# Patient Record
Sex: FEMALE | Race: WHITE | URBAN - METROPOLITAN AREA
[De-identification: names, ages, dates, MRNs, and addresses within clinical notes are randomized per-mention and may not be internally consistent; named-entity substitution may affect disease eponyms.]

---

## 2022-02-15 ENCOUNTER — NURSE ONLY (OUTPATIENT)
Dept: INTERNAL MEDICINE CLINIC | Facility: HOSPITAL | Age: 27
End: 2022-02-15
Attending: EMERGENCY MEDICINE

## 2022-02-15 DIAGNOSIS — Z00.00 WELLNESS EXAMINATION: Primary | ICD-10-CM

## 2022-02-15 PROCEDURE — 86480 TB TEST CELL IMMUN MEASURE: CPT

## 2022-02-16 LAB
M TB IFN-G CD4+ T-CELLS BLD-ACNC: 0.02 IU/ML
M TB TUBERC IFN-G BLD QL: NEGATIVE
M TB TUBERC IGNF/MITOGEN IGNF CONTROL: >10 IU/ML
QFT TB1 AG MINUS NIL: 0.03 IU/ML
QFT TB2 AG MINUS NIL: 0.03 IU/ML

## 2022-05-31 ENCOUNTER — NURSE ONLY (OUTPATIENT)
Dept: INTERNAL MEDICINE CLINIC | Facility: HOSPITAL | Age: 27
End: 2022-05-31
Attending: EMERGENCY MEDICINE

## 2022-05-31 DIAGNOSIS — Z00.00 WELLNESS EXAMINATION: Primary | ICD-10-CM

## 2022-05-31 PROCEDURE — 86480 TB TEST CELL IMMUN MEASURE: CPT

## 2022-06-01 LAB
M TB IFN-G CD4+ T-CELLS BLD-ACNC: 0.02 IU/ML
M TB TUBERC IFN-G BLD QL: NEGATIVE
M TB TUBERC IGNF/MITOGEN IGNF CONTROL: >10 IU/ML
QFT TB1 AG MINUS NIL: 0.01 IU/ML
QFT TB2 AG MINUS NIL: 0.03 IU/ML

## 2022-06-24 ENCOUNTER — TELEPHONE (OUTPATIENT)
Dept: INTERNAL MEDICINE CLINIC | Facility: HOSPITAL | Age: 27
End: 2022-06-24

## 2022-06-24 ENCOUNTER — LAB ENCOUNTER (OUTPATIENT)
Dept: LAB | Facility: HOSPITAL | Age: 27
End: 2022-06-24
Attending: PREVENTIVE MEDICINE

## 2022-06-24 DIAGNOSIS — Z20.822 SUSPECTED 2019 NOVEL CORONAVIRUS INFECTION: ICD-10-CM

## 2022-06-24 DIAGNOSIS — Z20.822 SUSPECTED 2019 NOVEL CORONAVIRUS INFECTION: Primary | ICD-10-CM

## 2022-06-24 LAB — SARS-COV-2 RNA RESP QL NAA+PROBE: DETECTED

## 2022-06-24 NOTE — TELEPHONE ENCOUNTER
Results and RTW guidelines:    COVID RESULT:    [x] Viewed by employee in [address] RTW plan and instructions as indicated on triage call. Manager notified. Estimated RTW date: 6/28  [x] Discussed with employee   [] Unable to reach by phone. Sent via [employer] message      Test type:    [x] Rapid         [] Alinity         [] Outside test:       [x] Positive  Is employee unvaccinated? Yes []   No [x]          If yes:  Enter Covid Positive Medical exemption on Exemption Spreadsheet    Did you have close contact with someone on your unit while not wearing a mask? (e.g., during meal breaks):  Yes []   No []     If yes, who:     - Employee should quarantine at home for at least 5 days (day 1 is day after sx onset) , follow the CDC guidelines for cleaning and                              quarantining; see CDC.gov   -This employee may RTW on day 6 if asymptomatic or mildly symptomatic (with improving symptoms). Call Employee Health on day 5 if unable to return on day 6 after                      symptom onset.    -This employee needs to call [employer] on day 5 after symptom onset. The employee needs to be cleared by [employer]. - Monitor symptoms and temperature                 - Notify PCP of result                 - Seek emergent care with worsening symptoms   - If employee is still experiencing severe symptoms on day 5 must make a RTW appt with [employer], Employee will not be cleared if:    1. Has consistent cough, shortness of breath or fatigue that restricts your physical activities    2. Is still feeling \"unwell\"    3. Within 15 days of hospitalization for COVID    4. Within 20 days of intubation for COVID    5.  Still has a fever, vomiting or diarrhea   - Keep communication open with management about RTW and if symptoms worsen                - If outside testing completed, bring a copy of result to RTW appointment           Notes:     RTW PLAN:    [x]  If COVID positive results, off work minimum of 5 days from positive test or onset of symptoms (day 0)        On day 5, if asymptomatic or mildly symptomatic (with improving symptoms) may return to work day 6          On day 5, if symptomatic, call Employee Health for RTW screening        []  COVID positive result - call Employee Health on day 5 after symptom onset. The employee needs to be cleared by Employee Health to RTW. [] RTW immediately, continue to monitor for sx  [] RTW when sx improve; must be fever free for 24 hours w/o medications, Diarrhea/Vomiting free for 24 hours w/o medications  [] Alinity ordered; continue to monitor sx and call for new/worsening sx.   Discuss RTW guidelines with manager  [] May continue to work  [] Follow up with PCP  [] Home until further instruction from hotline with Alinity results  INSTRUCTIONS PROVIDED:  [x]  Plan as noted above  []  Length of time to obtain results   [x]  Quarantine instructions  [x]  Masking protocol   [x]  S/S of worsening infection/condition and importance of prompt medical re-evaluation including when to seek emergency care  [] If symptoms develop, stay home and call hotline for rapid test order    Estimated RTW date:  6/28    [x] The employee voiced understanding of above plan/instructions  [x] Manager Notified

## 2022-10-28 ENCOUNTER — IMMUNIZATION (OUTPATIENT)
Dept: LAB | Facility: HOSPITAL | Age: 27
End: 2022-10-28
Attending: PREVENTIVE MEDICINE

## 2022-10-28 DIAGNOSIS — Z23 NEED FOR VACCINATION: Primary | ICD-10-CM

## 2022-10-28 PROCEDURE — 0124A SARSCOV2 VAC BVL 30MCG/0.3ML: CPT

## 2022-10-28 PROCEDURE — 90471 IMMUNIZATION ADMIN: CPT

## 2022-11-22 ENCOUNTER — OFFICE VISIT (OUTPATIENT)
Dept: OBGYN CLINIC | Facility: CLINIC | Age: 27
End: 2022-11-22
Payer: COMMERCIAL

## 2022-11-22 ENCOUNTER — LAB ENCOUNTER (OUTPATIENT)
Dept: LAB | Age: 27
End: 2022-11-22
Attending: ADVANCED PRACTICE MIDWIFE
Payer: COMMERCIAL

## 2022-11-22 VITALS
HEIGHT: 72 IN | DIASTOLIC BLOOD PRESSURE: 73 MMHG | SYSTOLIC BLOOD PRESSURE: 106 MMHG | WEIGHT: 159 LBS | HEART RATE: 76 BPM | BODY MASS INDEX: 21.54 KG/M2

## 2022-11-22 DIAGNOSIS — Z13.220 SCREENING, LIPID: ICD-10-CM

## 2022-11-22 DIAGNOSIS — Z31.9 PATIENT DESIRES PREGNANCY: ICD-10-CM

## 2022-11-22 DIAGNOSIS — Z13.1 SCREENING FOR DIABETES MELLITUS: ICD-10-CM

## 2022-11-22 DIAGNOSIS — Z01.419 WOMEN'S ANNUAL ROUTINE GYNECOLOGICAL EXAMINATION: Primary | ICD-10-CM

## 2022-11-22 DIAGNOSIS — Z13.29 THYROID DISORDER SCREEN: ICD-10-CM

## 2022-11-22 LAB
CHOLEST SERPL-MCNC: 179 MG/DL (ref ?–200)
FASTING PATIENT GLUCOSE ANSWER: NO
FASTING PATIENT LIPID ANSWER: NO
GLUCOSE BLD-MCNC: 81 MG/DL (ref 70–99)
HDLC SERPL-MCNC: 81 MG/DL (ref 40–59)
LDLC SERPL CALC-MCNC: 89 MG/DL (ref ?–100)
NONHDLC SERPL-MCNC: 98 MG/DL (ref ?–130)
TRIGL SERPL-MCNC: 44 MG/DL (ref 30–149)
TSI SER-ACNC: 1.37 MIU/ML (ref 0.36–3.74)
VLDLC SERPL CALC-MCNC: 7 MG/DL (ref 0–30)

## 2022-11-22 PROCEDURE — 3074F SYST BP LT 130 MM HG: CPT | Performed by: ADVANCED PRACTICE MIDWIFE

## 2022-11-22 PROCEDURE — 36415 COLL VENOUS BLD VENIPUNCTURE: CPT

## 2022-11-22 PROCEDURE — 99385 PREV VISIT NEW AGE 18-39: CPT | Performed by: ADVANCED PRACTICE MIDWIFE

## 2022-11-22 PROCEDURE — 80061 LIPID PANEL: CPT

## 2022-11-22 PROCEDURE — 82947 ASSAY GLUCOSE BLOOD QUANT: CPT

## 2022-11-22 PROCEDURE — 3078F DIAST BP <80 MM HG: CPT | Performed by: ADVANCED PRACTICE MIDWIFE

## 2022-11-22 PROCEDURE — 3008F BODY MASS INDEX DOCD: CPT | Performed by: ADVANCED PRACTICE MIDWIFE

## 2022-11-22 PROCEDURE — 84443 ASSAY THYROID STIM HORMONE: CPT

## 2023-09-25 ENCOUNTER — LAB ENCOUNTER (OUTPATIENT)
Dept: LAB | Age: 28
End: 2023-09-25
Attending: FAMILY MEDICINE
Payer: COMMERCIAL

## 2023-09-25 ENCOUNTER — OFFICE VISIT (OUTPATIENT)
Dept: FAMILY MEDICINE CLINIC | Facility: CLINIC | Age: 28
End: 2023-09-25

## 2023-09-25 VITALS
BODY MASS INDEX: 21.13 KG/M2 | HEART RATE: 83 BPM | HEIGHT: 72 IN | WEIGHT: 156 LBS | DIASTOLIC BLOOD PRESSURE: 75 MMHG | TEMPERATURE: 98 F | OXYGEN SATURATION: 98 % | SYSTOLIC BLOOD PRESSURE: 112 MMHG

## 2023-09-25 DIAGNOSIS — Z01.419 WELL WOMAN EXAM WITH ROUTINE GYNECOLOGICAL EXAM: Primary | ICD-10-CM

## 2023-09-25 DIAGNOSIS — G89.29 CHRONIC LEFT SHOULDER PAIN: ICD-10-CM

## 2023-09-25 DIAGNOSIS — Z12.4 SCREENING FOR CERVICAL CANCER: ICD-10-CM

## 2023-09-25 DIAGNOSIS — M25.512 CHRONIC LEFT SHOULDER PAIN: ICD-10-CM

## 2023-09-25 LAB
ANION GAP SERPL CALC-SCNC: 7 MMOL/L (ref 0–18)
BASOPHILS # BLD AUTO: 0.02 X10(3) UL (ref 0–0.2)
BASOPHILS NFR BLD AUTO: 0.4 %
BUN BLD-MCNC: 13 MG/DL (ref 7–18)
BUN/CREAT SERPL: 13 (ref 10–20)
CALCIUM BLD-MCNC: 9.4 MG/DL (ref 8.5–10.1)
CHLORIDE SERPL-SCNC: 110 MMOL/L (ref 98–112)
CHOLEST SERPL-MCNC: 183 MG/DL (ref ?–200)
CO2 SERPL-SCNC: 25 MMOL/L (ref 21–32)
CREAT BLD-MCNC: 1 MG/DL
DEPRECATED RDW RBC AUTO: 43.1 FL (ref 35.1–46.3)
EGFRCR SERPLBLD CKD-EPI 2021: 79 ML/MIN/1.73M2 (ref 60–?)
EOSINOPHIL # BLD AUTO: 0.02 X10(3) UL (ref 0–0.7)
EOSINOPHIL NFR BLD AUTO: 0.4 %
ERYTHROCYTE [DISTWIDTH] IN BLOOD BY AUTOMATED COUNT: 12.2 % (ref 11–15)
FASTING PATIENT LIPID ANSWER: NO
FASTING STATUS PATIENT QL REPORTED: NO
GLUCOSE BLD-MCNC: 94 MG/DL (ref 70–99)
HCT VFR BLD AUTO: 42.8 %
HDLC SERPL-MCNC: 85 MG/DL (ref 40–59)
HGB BLD-MCNC: 13.6 G/DL
IMM GRANULOCYTES # BLD AUTO: 0 X10(3) UL (ref 0–1)
IMM GRANULOCYTES NFR BLD: 0 %
LDLC SERPL CALC-MCNC: 88 MG/DL (ref ?–100)
LYMPHOCYTES # BLD AUTO: 1.4 X10(3) UL (ref 1–4)
LYMPHOCYTES NFR BLD AUTO: 26.9 %
MAGNESIUM SERPL-MCNC: 2 MG/DL (ref 1.6–2.6)
MCH RBC QN AUTO: 30.4 PG (ref 26–34)
MCHC RBC AUTO-ENTMCNC: 31.8 G/DL (ref 31–37)
MCV RBC AUTO: 95.7 FL
MONOCYTES # BLD AUTO: 0.25 X10(3) UL (ref 0.1–1)
MONOCYTES NFR BLD AUTO: 4.8 %
NEUTROPHILS # BLD AUTO: 3.51 X10 (3) UL (ref 1.5–7.7)
NEUTROPHILS # BLD AUTO: 3.51 X10(3) UL (ref 1.5–7.7)
NEUTROPHILS NFR BLD AUTO: 67.5 %
NONHDLC SERPL-MCNC: 98 MG/DL (ref ?–130)
OSMOLALITY SERPL CALC.SUM OF ELEC: 294 MOSM/KG (ref 275–295)
PLATELET # BLD AUTO: 376 10(3)UL (ref 150–450)
POTASSIUM SERPL-SCNC: 4 MMOL/L (ref 3.5–5.1)
RBC # BLD AUTO: 4.47 X10(6)UL
SODIUM SERPL-SCNC: 142 MMOL/L (ref 136–145)
THYROPEROXIDASE AB SERPL-ACNC: 32 U/ML (ref ?–60)
TRIGL SERPL-MCNC: 51 MG/DL (ref 30–149)
TSI SER-ACNC: 0.64 MIU/ML (ref 0.36–3.74)
VLDLC SERPL CALC-MCNC: 8 MG/DL (ref 0–30)
WBC # BLD AUTO: 5.2 X10(3) UL (ref 4–11)

## 2023-09-25 PROCEDURE — 99385 PREV VISIT NEW AGE 18-39: CPT | Performed by: FAMILY MEDICINE

## 2023-09-25 PROCEDURE — 90686 IIV4 VACC NO PRSV 0.5 ML IM: CPT | Performed by: FAMILY MEDICINE

## 2023-09-25 PROCEDURE — 84443 ASSAY THYROID STIM HORMONE: CPT

## 2023-09-25 PROCEDURE — 83735 ASSAY OF MAGNESIUM: CPT

## 2023-09-25 PROCEDURE — 3074F SYST BP LT 130 MM HG: CPT | Performed by: FAMILY MEDICINE

## 2023-09-25 PROCEDURE — 85025 COMPLETE CBC W/AUTO DIFF WBC: CPT

## 2023-09-25 PROCEDURE — 3078F DIAST BP <80 MM HG: CPT | Performed by: FAMILY MEDICINE

## 2023-09-25 PROCEDURE — 90471 IMMUNIZATION ADMIN: CPT | Performed by: FAMILY MEDICINE

## 2023-09-25 PROCEDURE — 3008F BODY MASS INDEX DOCD: CPT | Performed by: FAMILY MEDICINE

## 2023-09-25 PROCEDURE — 36415 COLL VENOUS BLD VENIPUNCTURE: CPT

## 2023-09-25 PROCEDURE — 80061 LIPID PANEL: CPT

## 2023-09-25 PROCEDURE — 80048 BASIC METABOLIC PNL TOTAL CA: CPT

## 2023-09-29 LAB — LAST PAP RESULT: NORMAL

## 2024-05-09 ENCOUNTER — OFFICE VISIT (OUTPATIENT)
Dept: OBGYN CLINIC | Facility: CLINIC | Age: 29
End: 2024-05-09
Payer: COMMERCIAL

## 2024-05-09 ENCOUNTER — TELEPHONE (OUTPATIENT)
Dept: OBGYN CLINIC | Facility: CLINIC | Age: 29
End: 2024-05-09

## 2024-05-09 VITALS
DIASTOLIC BLOOD PRESSURE: 83 MMHG | HEIGHT: 72 IN | BODY MASS INDEX: 20.99 KG/M2 | HEART RATE: 67 BPM | WEIGHT: 155 LBS | SYSTOLIC BLOOD PRESSURE: 118 MMHG

## 2024-05-09 DIAGNOSIS — N92.6 MISSED MENSES: Primary | ICD-10-CM

## 2024-05-09 LAB
CONTROL LINE PRESENT WITH A CLEAR BACKGROUND (YES/NO): YES YES/NO
KIT LOT #: NORMAL NUMERIC
PREGNANCY TEST, URINE: POSITIVE

## 2024-05-09 NOTE — PATIENT INSTRUCTIONS
Healthy Eating Habits During Pregnancy    It’s important to develop healthy eating habits while you are pregnant, for you as well as for your baby. Here are some ways to stay healthy.  Aim for a healthy weight  A slow, steady rate of weight gain is often best. After the first trimester, you may gain about a pound a week. If you were overweight before pregnancy, you need to gain fewer pounds. Your healthcare provider can give you a healthy weight goal for your pregnancy.  Don’t diet  Now is not the time to diet. You may not get enough of the nutrients you and your baby need. Instead, learn how to be a healthy eater. Start by doing it for your baby. Soon, you may do it for yourself.  Vitamins and supplements  Talk with your healthcare provider about taking these and other prenatal vitamins and supplements.  Iron makes the extra blood you need now.  Calcium and vitamin D help build and keep strong bones.  Folic acid helps prevent certain birth defects.  Iodine helps the thyroid work right.  Some vitamins may not be safe to take. Your healthcare provider will tell you which ones to avoid.  Fluids  Drink at least 8 to 10 cups of fluid daily. Your baby needs fluids. Fluids also decrease constipation, flush out toxins and waste, limit swelling, and help prevent bladder infections. Water is best. Other good choices are:  Water or seltzer water with a slice of lemon or lime (These can also help ease an upset stomach.)  Clear soups that are low in salt  Low-fat or fat-free milk, soy or rice milk with calcium added  Popsicles or gelatin  Things to avoid  Some things might harm your growing baby. Don’t eat or drink:  Alcohol  Unpasteurized dairy foods and juices  Raw or undercooked meat, poultry, fish, or eggs  Unwashed fruits and vegetables  Prepared meats, like deli meats or hot dogs, unless heated until steaming hot  Fish that are high in mercury, like shark, swordfish, raheem mackerel, tilefish, and albacore tuna  Things to  limit  Ask your healthcare provider whether it’s safe to eat or drink:  Caffeine  Artificial sweeteners  Organ meats  Certain types of fish  Fish and shellfish that contain mercury in lower amounts, like shrimp, canned light tuna, salmon, pollock, and catfish  Vj last reviewed this educational content on 2018 [employer]. [address] [place] [address], [place] [address]. All rights reserved. This information is not intended as a substitute for professional medical care. Always follow your healthcare professional's instructions.        Nutrition During Pregnancy    Having a healthy baby depends mostly on you. What you eat matters to your baby and your health. During pregnancy, you will likely need about 300 more calories per day than before you became pregnant. Each day, try to eat the number of servings listed here for each food group. In addition, cut down on salt and caffeine. Limit the amount of sweets and high-fat foods you eat. Don’t smoke or drink alcohol.  Important: See your healthcare provider as often as requested. If you have any questions, be sure to ask them.  Fruits  2 cups  Examples of 1-cup servings:  1 medium apple  1 medium orange  1 medium banana  1 cup chopped fruit  1 cup 100% fruit juice (pasteurized)  1/2 cup dried fruit Vegetables  2-1/2 to 3 cups   Examples of 1 servin cups raw, leafy greens  1 cup raw or cooked cut-up vegetables  1 cup 100% vegetable juice (pasteurized) Grains & Cereals*  6 to 8 ounces  Examples of 1-ounce servings:  1 slice bread  1/2 cup cooked rice  1/2 cup cooked cereal  1/2 cup pasta  1 ounce cold cereal Fats & Oils  6 to 8 teaspoons   Dairy**  3 cups  Examples of 1-cup servings:  1 cup milk  1 cup yogurt  1-1/2 ounces natural cheese  2 ounces processed cheese Protein---  5 to 6-1/2 ounces  Examples of 1-ounce servings:  1 egg  1 ounce of lean meat, poultry, or fish  1/4 cup cooked beans  1 tablespoon peanut butter  1/2 ounce nuts  Fluids  8 or more 8-ounce glasses  Examples:  Water  Diluted juices: Apple, orange, cranberry  Mineral water  Clear soups, broth     *Note: Choose whole grains whenever possible.  ** Note: Try to choose low-fat options; avoid soft cheeses and unpasteurized milk.  --- Notes: Avoid raw or undercooked meats, eggs, and seafood. fish, and shellfish. Also, some types of fish, like shark, swordfish, and raheem mackerel should not be eaten during pregnancy. Avoid hot dogs, luncheon meats, and cold cuts unless heated to steaming just before being served. Ask your healthcare provider about safe choices.     Prenatal supplements  A prenatal supplement is a pill that you take daily during pregnancy. It helps make sure you’re getting the right amount of certain nutrients that are important to your baby. Ask your healthcare provider to help you choose the best one for you. Important nutrients during pregnancy include:  Folic acid. It's best to start taking this supplement 1 month before you start trying to get pregnant. Folic acid helps prevent certain problems in your baby. During pregnancy, you need to take 400 micrograms (mcg) of folic acid every day for the first 2 to 3 months after conception, and then 600 mcg is needed for growing fetus and placenta.  Iron, calcium, and vitamin D. You may also be advised to take these supplements during pregnancy. They help keep you and your baby healthy. Be sure to take them at different times because calcium makes it hard for the body to absorb iron. Taking iron with orange juice helps to increase its absorption.   [employer] last reviewed this educational content on 12/1/2017 © 2000-2020 The [employer]. [address] [place] [address], [place] [address]. All rights reserved. This information is not intended as a substitute for professional medical care. Always follow your healthcare professional's instructions.        Pregnancy: Planning Your Exercise Routine    While you’re pregnant, an  exercise routine helps both your mind and your body feel good. It tones your muscles and makes them stronger. It also gives you and your baby more oxygen.  The right exercise for you  Overall conditioning is best for you and your baby. Try walking, swimming, or riding a stationary bike. Always warm up, cool down, and drink enough fluids. Keep a snack close by in case your blood sugar gets low. Discuss exercise choices with your healthcare provider. Talk about the following:  If you already exercise, find out how to adapt your routine while you’re pregnant. Keep the intensity of the exercise moderate.  Ask if there are any local prenatal exercise classes, like yoga or water aerobics. Find out which prenatal exercise videos are good choices.  If you were not exercising before your pregnancy, find out the best way to start. Now is not the time to begin a new workout on your own. Start slowly. Listen to your body.  Ask which forms of exercise you should avoid. These may include risky activities like hot yoga, horseback riding, scuba diving, skiing, skating, and contact sports.  Pelvic tilts  These help strengthen your stomach muscles and low back. You can do pelvic tilts instead of sit-ups.  Do this exercise on your hands and knees.  Relax the back of your neck. Pull your stomach in until your low back flattens.  Hold for 30 seconds. Release. Repeat 10 times. Do this twice a day.  Kegel exercises  Kegel exercises strengthen the pelvic muscles. Doing Kegels daily helps prepare these muscles for delivery. Kegels also help ease your recovery. You exercise these muscles by tightening, holding, then relaxing them. To do 1 type of Kegel exercise, contract as if you were stopping your urine stream (but do it when you’re not urinating). Hold for 10 seconds, then repeat 10 times, a few times a day.  Tips to add activity  Here are some tips to follow:  Park the car farther from a store and walk.  If you can, do errands on foot  instead of driving.  Walk across the office to talk to someone in person instead of calling.  While waiting for appointments, go up and down stairs or around the block.   Tips to stay active  Here are some tips to follow:  Maintain your routine. But exercise less intensely if you feel tired.  Base your workout on how you feel, not your heart rate. Heart rates aren’t a good way to measure effort during pregnancy.  Avoid exercising on your back after week 16.   What are the warning signs that I should stop exercising?  Stop exercising and call your healthcare provider if you have any of these symptoms:  Vaginal bleeding   Dizziness or feeling faint   Increased shortness of breath   Chest pain   Headache   Muscle weakness   Calf (back of the leg) pain or swelling    Uterine contractions or pre-term labor   Decreased fetal movement   Fluid leaking (or gushing) from your vagina  [employer] last reviewed this educational content on 1/1/2018  © 1454-5536 [employer]. [address] [place] [address]. All rights reserved. This information is not intended as a substitute for professional medical care. Always follow your healthcare professional's instructions.        Exercise During Pregnancy  Regular exercise can help you adapt to the changes your body is going through during pregnancy. Exercising may help you relax, and it gets you ready for labor and delivery. Talk to your healthcare provider about the kinds of activities you can do. Then go ahead and enjoy them.    Get started  Even if you didn’t exercise before pregnancy, it is not too late to start. Choose an activity that you like and that fits your lifestyle. Begin slowly and build up a little at a time. Be sure to check with your healthcare provider before starting any exercise program. The following tips may help you get started:  Choose a time and place to exercise each day.  Wear loose-fitting clothes and comfortable athletic shoes.  Stretch  before and after you exercise. (Be sure to stretch slowly and to hold stretches for 30 to 40 seconds.)  Be active  Unless your healthcare provider says otherwise, try to exercise for 30 minutes or more most days of the week:  Overall conditioning, like swimming, bicycling, or walking, is especially beneficial.  Aerobics and exercises that increase your pulse rate help condition your body and strengthen your heart. Ask about special prenatal aerobics classes.  Exercise safely  These tips will help you have a safe, healthy workout:  Stay cool. Stop exercising if you feel overheated.  Slow down if you’re out of breath. If you can’t talk during exercise, lower the intensity of the workout.  Monitor the intensity of your workout. Only do moderate-intensity (not strenuous) exercise.  Stay off your back. Lying on your back can decrease blood flow to your baby.  Drink water before, during and after your workout.  Eat 300 extra calories a day. A light snack before and after you exercise will help keep your energy up.  Avoid activities requiring balancing skills later in pregnancy.  Do Kegel exercises  Kegel exercises strengthen the pelvic floor muscles used in childbirth. These muscles are the same ones used to stop the flow of urine. Do Kegel exercises daily:  Squeeze your pelvic floor muscles for a count of 3.  Relax, then squeeze again.  Repeat 10 to 15 times in a row at least 3 times a day.  You can do Kegel exercises anytime and anywhere.  Keep walking  No matter what other exercise you do, try to walk whenever you can:  If you’re working all day, take a lunchtime walk in the park with a friend.  When you shop, park away from the store entrance and walk the extra distance.  Take the stairs instead of the elevator.     When to stop exercising and call your healthcare provider  Call your healthcare provider right away if you have:  Shortness of breath before starting exercise  Vaginal bleeding  Dizziness or feeling  faint  Chest pain  Headache  Decreased fetal movement   contractions   Muscle weakness  Calf pain or swelling  Fluid leaking from the vagina   [email or web address] last reviewed this educational content on 2017 The [employer]. [address] [place] [address], [place] [address]. All rights reserved. This information is not intended as a substitute for professional medical care. Always follow your healthcare professional's instructions.        Pregnancy: Your Weight     Your weight will be checked regularly by your healthcare provider.   Being a healthy weight is important for both you and your baby. The weight you gain now is not just extra fat. It is also the weight of your baby. And it is the increased blood and fluids to support the baby. A slow, steady rate of gain is best. How much you should gain depends on your weight before getting pregnant. Check with your healthcare provider to find out what is right for you.  Talk to your healthcare provider if you have any questions.   If you gain too much  Gaining too much weight might cause you to feel tired or you could have a harder pregnancy or birth. If you and your healthcare provider decide you’re gaining too much:  Eat fewer fats and sugars. Instead, eat fruit, vegetables, and whole-grain foods.  Drink plenty of water between meals.  Get at least 20 minutes of light exercise, like walking, each day.  Don’t diet. You might not get enough of the nutrients you or your baby needs.  Keep a diet diary to help you gauge what and how much you are eating.  If you’re not gaining enough  If you don’t gain enough, your baby could be too small or have health problems. Women tend to gain most of their weight in the second and third trimesters. For now:  Eat many types of foods. Make sure you get enough calcium, protein, and carbohydrates.  Don’t skip meals.  Eat healthy snacks.  Pick nutrient-dense, high-calorie healthy food like trail mix or protein  [place].  See a dietitian for help.  Talk to your healthcare provider if you have had an eating disorder or problems with certain foods.  The following are ways to get more calories:  Eat breakfast every day. Peanut butter or a slice of cheese on toast can give you an extra protein boost.  Snack between meals. Yogurt and dried fruits can provide protein, calcium, and minerals.  Try to eat more foods that are high in good fats, like nuts, fatty fish, avocados, and olive oil.  Drink juices made from real fruit that are high in vitamin C or beta carotene, like grapefruit juice, orange juice, papaya nectar, apricot nectar, and carrot juice.  Avoid junk food, like foods high in sugar.  [employer] last reviewed this educational content on 1/1/2018 © 2000-2020 The [employer]. [address] [place] [address]. All rights reserved. This information is not intended as a substitute for professional medical care. Always follow your healthcare professional's instructions.        Dental Care for Pregnant Women  Pregnancy is a time when your oral health needs more attention. Hormone changes during pregnancy can cause certain problems with teeth and gums, and make treatment more complicated.  How pregnancy affects oral health  During pregnancy, hormone changes can cause swollen, bleeding, and irritated gums (gingivitis). Your gums may be very sore, and brushing and flossing may cause discomfort. If left untreated, gingivitis can lead to a more serious gum disease called periodontitis. Severe periodontitis can lead to tooth loss.  Some pregnant women also have small bright-red growths on their gums that bleed easily. These are often called “pregnancy tumors.” They are not cancer. They usually go away right after birth. Talk with your dentist or healthcare provider if you have concerns.  Keeping a healthy mouth  Brush twice daily with fluoride toothpaste. Floss at least once a day.  If you have morning sickness,  rinse your mouth with a teaspoon of baking soda mixed with water after vomiting. Do not brush your teeth right after vomiting. This can remove tooth enamel.  See your dentist for cleanings and checkups more often if needed. This is especially true in your second and third trimesters.  Ask your dentist or healthcare provider if you should use a special mouth rinse to help prevent gingivitis.  Tell your dentist or healthcare provider about any changes in your mouth, such as soreness or bleeding.  Safety concerns  Make sure to tell your dentist that you’re pregnant. He or she can help you stay safe. If you need to have dental X-rays during pregnancy, he or she will make sure you are fully protected. You will wear a lead apron over your belly during the X-ray process. The apron helps block radiation from the X-rays.  If you need to take medicines like antibiotics or pain relievers for dental problems, talk with your healthcare providers first. Your providers will discuss the risks and benefits of taking these during pregnancy.  If you have a high-risk pregnancy, your dentist and your healthcare provider may advise that certain dental treatments wait until after you give birth.  Vj last reviewed this educational content on 12/1/2017  © 9616-0563 [employer], [email or web address]. [address] [place] [address], [place] [address]. All rights reserved. This information is not intended as a substitute for professional medical care. Always follow your healthcare professional's instructions.

## 2024-05-09 NOTE — TELEPHONE ENCOUNTER
Patient called to schedule ultrasound. [facility] does not have order and not sure what patient is requesting. Please fax copy of order to [phone]

## 2024-05-09 NOTE — PROGRESS NOTES
CHIEF COMPLAINT:  Chief Complaint   Patient presents with    Gyn Exam     Missed mense LMP , spotting last week , with cramping. Clear discharge       HPI:  Cleopatra is 29 year old, female, here today for a missed menses visit.  Currently, she is feeling well. She had some spotting a few days last week but none since then. Denies cramping/pain. Her and her  have been trying for pregnancy for 1.5 years. Reports regular 28 day cycles.    Patient's last menstrual period was 2024.            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 No pertinent past medical history.   Past Surgical History:   Procedure Laterality Date    Other surgical history      Bunion Surgery    [place] teeth removed Bilateral       Family History   Problem Relation Age of Onset    Thyroid disease Mother     Cancer Maternal Grandmother         Breast cancer    Diabetes Maternal Grandmother     Heart Disease Maternal Grandfather     Cancer Paternal Grandmother         Breast cancer and lung cancer    Heart Disease Paternal Grandfather     Cancer Paternal Grandfather         Lung Cancer    Heart Attack Paternal Grandfather       Social History:   Social History     Socioeconomic History    Marital status:    Tobacco Use    Smoking status: Never     Passive exposure: Never    Smokeless tobacco: Never   Substance and Sexual Activity    Alcohol use: Yes     Alcohol/week: 4.0 standard drinks of alcohol     Types: 1 Glasses of wine, 3 Standard drinks or equivalent per week    Drug use: Never       Safe relationship/safe home environment      Medications (Active prior to today's visit):  No current outpatient medications on file.       Allergies:  No Known Allergies    REVIEW OF SYSTEMS:  Review of Systems   Constitutional: Negative.    Respiratory: Negative.     Cardiovascular: Negative.    Gastrointestinal: Negative.     Genitourinary:  Negative for difficulty urinating, dyspareunia, dysuria, frequency, genital sores, hematuria, menstrual problem, pelvic pain, urgency, vaginal bleeding, vaginal discharge and vaginal pain.   Neurological: Negative.    Psychiatric/Behavioral: Negative.          PHYSICAL EXAM:  Vitals:    05/09/24 1155   BP: 118/83   Pulse: 67     Physical Exam  Vitals and nursing note reviewed.   Constitutional:       General: She is not in acute distress.     Appearance: Normal appearance. She is normal weight. She is not ill-appearing.   HENT:      Head: Normocephalic and atraumatic.   Cardiovascular:      Rate and Rhythm: Normal rate.   Pulmonary:      Effort: Pulmonary effort is normal. No respiratory distress.   Neurological:      Mental Status: She is alert and oriented to person, place, and time.   Psychiatric:         Mood and Affect: Mood normal.         Behavior: Behavior normal.         Thought Content: Thought content normal.         Judgment: Judgment normal.          Recent Results (from the past 24 hour(s))   POC Urine pregnancy test [21665]    Collection Time: 05/09/24 12:05 PM   Result Value Ref Range    Pregnancy Test, Urine positive     Control Line Present with a clear background (yes/no) yes Yes/No    Kit Lot # [id number] Numeric    Kit Expiration Date 5/4/25 Date        ASSESSMENT/PLAN:   Cleopatra was seen today for gyn exam.    Diagnoses and all orders for this visit:    Missed menses  -     POC Urine pregnancy test [[id number]]  -     US OB 1st Trimester Abdominal <14 wks [[id number]]; Future  -     US OB Transvaginal (any trimester) [96365]; Future  - Viability US ordered per pt preference, pt instructed to schedule in 2-4 weeks from now  - 1T warning signs reviewed       Today's UCG positive result reviewed with patient  Appropriate for CNM care   Has prenatal vitamins that she is taking  ERx baby ASA n/a    Pre-eclampsia Risk Assessment:   High Risk Factors (any 1 of below):   - H/O preeclampsia in prior  pregnancy  - Autoimmune disease (lupus, antiphospholipid syndrome)  - Multifetal pregnancy  - cHTN  - Diabetes  Moderate Risk Factors (any 2 of below): 1/2    - Nulliparus  - Obesity  - H/O preeclampsia in 1st degree relative  - Socioeconomic characeristics (AA race, low socioeconomic status)  - AMA  - Personal H/O prior SGA or low birth weight, adverse pregnancy outcome, >10yr pregnancy interval    Next visit: Patient to schedule Nurse Education visit, next available, and NOB for 12wga    Counseling included:  -- CNM care, philosophy, and protocols  -- Discussed importance of folic acid, calcium, vitamin D  -- Reviewed pregnancy recommendations regarding weight gain, diet, safe food preparation and consumption  -- Travel discussed, avoid travel to zika and COVID zones, use of support stockings with flights longer than 3 hours, movement every 2-3 hours if driving  -- Discussed nutrition, exercise, weight gain.  -- Discussed avoidance of Jacuzzis, saunas, hot tubs and steam rooms  -- Discussed avoidance of alcohol, smoking, and minimizing caffeine  -- Warning signs reviewed. Advised to call office if occur. Safe use of [employer] for messaging  -- Reviewed genetic/chromosomal screening guidelines for pregnancies  -- Schedule RN OB ED visit   -- 30 minutes face to face counseling, chart review, orders and coordination of care    Pt verbalized understanding. All questions answered. No barriers to learning identified

## 2024-05-31 ENCOUNTER — HOSPITAL ENCOUNTER (OUTPATIENT)
Dept: ULTRASOUND IMAGING | Age: 29
Discharge: HOME | End: 2024-05-31
Attending: ADVANCED PRACTICE MIDWIFE
Payer: COMMERCIAL

## 2024-05-31 DIAGNOSIS — N92.6 MISSED MENSES: ICD-10-CM

## 2024-05-31 PROCEDURE — 76817 TRANSVAGINAL US OBSTETRIC: CPT | Performed by: ADVANCED PRACTICE MIDWIFE

## 2024-05-31 PROCEDURE — 76801 OB US < 14 WKS SINGLE FETUS: CPT | Performed by: ADVANCED PRACTICE MIDWIFE

## 2024-06-05 ENCOUNTER — NURSE ONLY (OUTPATIENT)
Dept: OBGYN CLINIC | Facility: CLINIC | Age: 29
End: 2024-06-05

## 2024-06-05 DIAGNOSIS — Z01.42 ENCOUNTER FOR PAPANICOLAOU CERVICAL SMEAR TO CONFIRM FINDINGS OF RECENT NORMAL SMEAR FOLLOWING INITIAL ABNORMAL SMEAR: Primary | ICD-10-CM

## 2024-06-05 DIAGNOSIS — Z34.01 ENCOUNTER FOR SUPERVISION OF NORMAL FIRST PREGNANCY IN FIRST TRIMESTER (HCC): ICD-10-CM

## 2024-06-05 RX ORDER — CHOLECALCIFEROL (VITAMIN D3) 25 MCG
1 TABLET,CHEWABLE ORAL DAILY
COMMUNITY

## 2024-06-05 NOTE — PROGRESS NOTES
Pt is a  with EDC of 01/15/2025 based on 24 FT ultrasound.     LMP-2024     Med Hx-[place] Teeth                 Bunion surgery    OB Hx-primip    Telephone OB RN Education visit. Education materials reviewed with pt including, but not limited to: plan of care, safe medications, guidance on nutrition, travel, food safety, when to call the MD,ect.     Pt agreeable to blood transfusion if needed.     First trimester prenatal labs ordered for pt.     Pt counseled on the availability of genetic screenings including: cell free DNA, FTS w/US,Quad screen, MSAFP, and CF screening. Pt refuses at this time.     Media policy for [facility] reviewed with pt.    EPDS score for today-0    Epidural-NO    Circumcision-yes    Feeding-breast    Transfusion-yes    How pt heard about the midwives office-[facility] RN    New OB visit with Yoly Mckeon on 2024

## 2024-06-13 ENCOUNTER — LAB ENCOUNTER (OUTPATIENT)
Dept: LAB | Facility: HOSPITAL | Age: 29
End: 2024-06-13
Attending: ADVANCED PRACTICE MIDWIFE
Payer: COMMERCIAL

## 2024-06-13 DIAGNOSIS — Z34.01 ENCOUNTER FOR SUPERVISION OF NORMAL FIRST PREGNANCY IN FIRST TRIMESTER (HCC): ICD-10-CM

## 2024-06-13 LAB
ANTIBODY SCREEN: NEGATIVE
BASOPHILS # BLD AUTO: 0.01 X10(3) UL (ref 0–0.2)
BASOPHILS NFR BLD AUTO: 0.2 %
DEPRECATED RDW RBC AUTO: 39.5 FL (ref 35.1–46.3)
EOSINOPHIL # BLD AUTO: 0.03 X10(3) UL (ref 0–0.7)
EOSINOPHIL NFR BLD AUTO: 0.5 %
ERYTHROCYTE [DISTWIDTH] IN BLOOD BY AUTOMATED COUNT: 12.1 % (ref 11–15)
EST. AVERAGE GLUCOSE BLD GHB EST-MCNC: 103 MG/DL (ref 68–126)
HBA1C MFR BLD: 5.2 % (ref ?–5.7)
HBV SURFACE AG SER-ACNC: 0.1 [IU]/L
HBV SURFACE AG SERPL QL IA: NONREACTIVE
HCT VFR BLD AUTO: 36.4 %
HCV AB SERPL QL IA: NONREACTIVE
HGB BLD-MCNC: 13 G/DL
IMM GRANULOCYTES # BLD AUTO: 0.02 X10(3) UL (ref 0–1)
IMM GRANULOCYTES NFR BLD: 0.3 %
LYMPHOCYTES # BLD AUTO: 1.59 X10(3) UL (ref 1–4)
LYMPHOCYTES NFR BLD AUTO: 25.3 %
MCH RBC QN AUTO: 32 PG (ref 26–34)
MCHC RBC AUTO-ENTMCNC: 35.7 G/DL (ref 31–37)
MCV RBC AUTO: 89.7 FL
MONOCYTES # BLD AUTO: 0.43 X10(3) UL (ref 0.1–1)
MONOCYTES NFR BLD AUTO: 6.8 %
NEUTROPHILS # BLD AUTO: 4.2 X10 (3) UL (ref 1.5–7.7)
NEUTROPHILS # BLD AUTO: 4.2 X10(3) UL (ref 1.5–7.7)
NEUTROPHILS NFR BLD AUTO: 66.9 %
PLATELET # BLD AUTO: 324 10(3)UL (ref 150–450)
RBC # BLD AUTO: 4.06 X10(6)UL
RH BLOOD TYPE: POSITIVE
RUBV IGG SER QL: POSITIVE
RUBV IGG SER-ACNC: 74 IU/ML (ref 10–?)
T PALLIDUM AB SER QL IA: NONREACTIVE
WBC # BLD AUTO: 6.3 X10(3) UL (ref 4–11)

## 2024-06-13 PROCEDURE — 87086 URINE CULTURE/COLONY COUNT: CPT

## 2024-06-13 PROCEDURE — 86901 BLOOD TYPING SEROLOGIC RH(D): CPT

## 2024-06-13 PROCEDURE — 86900 BLOOD TYPING SEROLOGIC ABO: CPT

## 2024-06-13 PROCEDURE — 87389 HIV-1 AG W/HIV-1&-2 AB AG IA: CPT

## 2024-06-13 PROCEDURE — 86780 TREPONEMA PALLIDUM: CPT

## 2024-06-13 PROCEDURE — 83020 HEMOGLOBIN ELECTROPHORESIS: CPT

## 2024-06-13 PROCEDURE — 86787 VARICELLA-ZOSTER ANTIBODY: CPT

## 2024-06-13 PROCEDURE — 83021 HEMOGLOBIN CHROMOTOGRAPHY: CPT

## 2024-06-13 PROCEDURE — 86850 RBC ANTIBODY SCREEN: CPT

## 2024-06-13 PROCEDURE — 83036 HEMOGLOBIN GLYCOSYLATED A1C: CPT

## 2024-06-13 PROCEDURE — 87340 HEPATITIS B SURFACE AG IA: CPT

## 2024-06-13 PROCEDURE — 86803 HEPATITIS C AB TEST: CPT

## 2024-06-13 PROCEDURE — 36415 COLL VENOUS BLD VENIPUNCTURE: CPT

## 2024-06-13 PROCEDURE — 86762 RUBELLA ANTIBODY: CPT

## 2024-06-13 PROCEDURE — 85025 COMPLETE CBC W/AUTO DIFF WBC: CPT

## 2024-06-14 LAB — VZV IGG SER IA-ACNC: 426.1 (ref 165–?)

## 2024-06-19 LAB
HGB A2 MFR BLD: 2.8 % (ref 1.5–3.5)
HGB PNL BLD ELPH: 97.2 % (ref 95.5–100)

## 2024-07-01 ENCOUNTER — LAB ENCOUNTER (OUTPATIENT)
Dept: LAB | Facility: HOSPITAL | Age: 29
End: 2024-07-01
Attending: ADVANCED PRACTICE MIDWIFE
Payer: COMMERCIAL

## 2024-07-01 ENCOUNTER — INITIAL PRENATAL (OUTPATIENT)
Dept: OBGYN CLINIC | Facility: CLINIC | Age: 29
End: 2024-07-01
Payer: COMMERCIAL

## 2024-07-01 VITALS
DIASTOLIC BLOOD PRESSURE: 73 MMHG | BODY MASS INDEX: 22 KG/M2 | HEART RATE: 76 BPM | WEIGHT: 163 LBS | SYSTOLIC BLOOD PRESSURE: 110 MMHG

## 2024-07-01 DIAGNOSIS — Z34.91 PRENATAL CARE, FIRST TRIMESTER (HCC): Primary | ICD-10-CM

## 2024-07-01 DIAGNOSIS — Z83.49 FAMILY HISTORY OF THYROID DISORDER: ICD-10-CM

## 2024-07-01 PROBLEM — Z34.90 PREGNANT (HCC): Status: ACTIVE | Noted: 2024-07-01

## 2024-07-01 LAB
T4 FREE SERPL-MCNC: 1.3 NG/DL (ref 0.8–1.7)
TSI SER-ACNC: 0.62 MIU/ML (ref 0.55–4.78)

## 2024-07-01 PROCEDURE — 84443 ASSAY THYROID STIM HORMONE: CPT

## 2024-07-01 PROCEDURE — 84439 ASSAY OF FREE THYROXINE: CPT

## 2024-07-01 PROCEDURE — 36415 COLL VENOUS BLD VENIPUNCTURE: CPT

## 2024-07-01 NOTE — PROGRESS NOTES
Here for NOB visit. Denies bleeding or pelvic pain.    TSH and FT4 ordered due to family history of thyroid disorder     Patient's last menstrual period was 2024. 1/15/2025, by Ultrasound 11w5d     NOB labs- WNL  Genetic screening- declines  Ultrasound: 24 at 8wk WNL  Med hx: nothing significant  Allergies: NKDA.    Problem list- Updated  EPDS=0 on 24     Pre-e risk: nullip  Prior births:n/a    Physical: WNL  Pap: 23 NILM    Warning signs reviewed.

## 2024-07-16 ENCOUNTER — NURSE TRIAGE (OUTPATIENT)
Dept: FAMILY MEDICINE CLINIC | Facility: CLINIC | Age: 29
End: 2024-07-16

## 2024-07-16 ENCOUNTER — TELEPHONE (OUTPATIENT)
Dept: OBGYN CLINIC | Facility: CLINIC | Age: 29
End: 2024-07-16

## 2024-07-16 DIAGNOSIS — R06.02 SHORTNESS OF BREATH: Primary | ICD-10-CM

## 2024-07-16 NOTE — TELEPHONE ENCOUNTER
Patient called in is 13 weeks pregnant.  Patient  request a referral to cologist Patient states she is having difficult breathing. .   Request a nurse to call to confirm

## 2024-07-16 NOTE — TELEPHONE ENCOUNTER
For the past 2 weeks, pt has been having occasional shortness of breath with light activity. Currently, pt denies any shortness of breath. Advised pt she needs to be evaluated at the [facility]. Pt does not want to be seen at the ER, but just wants a referral to see a cardiologist. Cardiology referral placed for pt. Advised pt if shortness of breath returns, she needs to be evaluated at the [facility] immediately. Pt voices understanding.

## 2024-07-16 NOTE — TELEPHONE ENCOUNTER
Action Requested: Summary for Provider     []  Critical Lab, Recommendations Needed  [x] Need Additional Advice  []   FYI    []   Need Orders  [] Need Medications Sent to Pharmacy  []  Other     SUMMARY: Per protocol, patient should go to ER. Rn will route to provider for advice.     Reason for call: Difficulty Breathing  Onset: Data Unavailable    Scheduling called patient due to notes under appointment that was made. She also sent a [employer] message. She is 14 weeks pregnant and experiencing shortness of breath with exertion or even a fast conversation. She was speaking in full and clear sentences. 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     She has had long travel in the past few weeks- 10 hour car rides but they stopped every two hours and she did get out and walk/stretch. She denies wheeze or stridor. She denies chest pain. She has not checked her pulse ox. She is not sick or congested. No history of blood clots.    She is scheduled to see Dr. Merida for appointment on 7/23/24. Dr. Merida is out of office.     Dr. Shah, for Dr. Merida,  please advise if appointment on 7/23 ok to keep, if she should schedule sooner or if ER appropriate due to recent long travel. She did stop every two hours to stretch and walk.     Reason for Disposition   Long-distance travel in past month (e.g., car, bus, train, plane; with trip lasting 6 or more hours)    Protocols used: Breathing Difficulty-A-[place]

## 2024-07-17 ENCOUNTER — HOSPITAL ENCOUNTER (EMERGENCY)
Facility: HOSPITAL | Age: 29
Discharge: HOME | End: 2024-07-17
Attending: EMERGENCY MEDICINE
Payer: COMMERCIAL

## 2024-07-17 ENCOUNTER — APPOINTMENT (OUTPATIENT)
Dept: GENERAL RADIOLOGY | Facility: HOSPITAL | Age: 29
End: 2024-07-17
Attending: EMERGENCY MEDICINE
Payer: COMMERCIAL

## 2024-07-17 ENCOUNTER — APPOINTMENT (OUTPATIENT)
Dept: CV DIAGNOSTICS | Facility: HOSPITAL | Age: 29
End: 2024-07-17
Attending: EMERGENCY MEDICINE
Payer: COMMERCIAL

## 2024-07-17 ENCOUNTER — APPOINTMENT (OUTPATIENT)
Dept: CT IMAGING | Facility: HOSPITAL | Age: 29
End: 2024-07-17
Attending: EMERGENCY MEDICINE
Payer: COMMERCIAL

## 2024-07-17 VITALS
HEART RATE: 77 BPM | OXYGEN SATURATION: 99 % | TEMPERATURE: 98 F | DIASTOLIC BLOOD PRESSURE: 77 MMHG | RESPIRATION RATE: 18 BRPM | SYSTOLIC BLOOD PRESSURE: 122 MMHG

## 2024-07-17 DIAGNOSIS — R06.00 DYSPNEA, UNSPECIFIED TYPE: Primary | ICD-10-CM

## 2024-07-17 DIAGNOSIS — Z3A.14 14 WEEKS GESTATION OF PREGNANCY (HCC): ICD-10-CM

## 2024-07-17 DIAGNOSIS — R73.9 HYPERGLYCEMIA: ICD-10-CM

## 2024-07-17 LAB
ANION GAP SERPL CALC-SCNC: 8 MMOL/L (ref 0–18)
ATRIAL RATE: 67 BPM
BASOPHILS # BLD AUTO: 0.01 X10(3) UL (ref 0–0.2)
BASOPHILS NFR BLD AUTO: 0.1 %
BNP SERPL-MCNC: 19 PG/ML
BUN BLD-MCNC: 7 MG/DL (ref 9–23)
BUN/CREAT SERPL: 8.4 (ref 10–20)
CALCIUM BLD-MCNC: 9.4 MG/DL (ref 8.7–10.4)
CHLORIDE SERPL-SCNC: 108 MMOL/L (ref 98–112)
CO2 SERPL-SCNC: 22 MMOL/L (ref 21–32)
CREAT BLD-MCNC: 0.83 MG/DL
DEPRECATED RDW RBC AUTO: 40.1 FL (ref 35.1–46.3)
EGFRCR SERPLBLD CKD-EPI 2021: 98 ML/MIN/1.73M2 (ref 60–?)
EOSINOPHIL # BLD AUTO: 0.01 X10(3) UL (ref 0–0.7)
EOSINOPHIL NFR BLD AUTO: 0.1 %
ERYTHROCYTE [DISTWIDTH] IN BLOOD BY AUTOMATED COUNT: 12.2 % (ref 11–15)
ERYTHROCYTE [SEDIMENTATION RATE] IN BLOOD: 20 MM/HR
GLUCOSE BLD-MCNC: 140 MG/DL (ref 70–99)
HCT VFR BLD AUTO: 39.9 %
HGB BLD-MCNC: 13.5 G/DL
IMM GRANULOCYTES # BLD AUTO: 0.04 X10(3) UL (ref 0–1)
IMM GRANULOCYTES NFR BLD: 0.5 %
LYMPHOCYTES # BLD AUTO: 1.38 X10(3) UL (ref 1–4)
LYMPHOCYTES NFR BLD AUTO: 16.1 %
MCH RBC QN AUTO: 30.5 PG (ref 26–34)
MCHC RBC AUTO-ENTMCNC: 33.8 G/DL (ref 31–37)
MCV RBC AUTO: 90.1 FL
MONOCYTES # BLD AUTO: 0.4 X10(3) UL (ref 0.1–1)
MONOCYTES NFR BLD AUTO: 4.7 %
NEUTROPHILS # BLD AUTO: 6.74 X10 (3) UL (ref 1.5–7.7)
NEUTROPHILS # BLD AUTO: 6.74 X10(3) UL (ref 1.5–7.7)
NEUTROPHILS NFR BLD AUTO: 78.5 %
OSMOLALITY SERPL CALC.SUM OF ELEC: 286 MOSM/KG (ref 275–295)
P AXIS: 60 DEGREES
P-R INTERVAL: 138 MS
PLATELET # BLD AUTO: 324 10(3)UL (ref 150–450)
POTASSIUM SERPL-SCNC: 3.6 MMOL/L (ref 3.5–5.1)
Q-T INTERVAL: 416 MS
QRS DURATION: 92 MS
QTC CALCULATION (BEZET): 439 MS
R AXIS: 81 DEGREES
RBC # BLD AUTO: 4.43 X10(6)UL
SODIUM SERPL-SCNC: 138 MMOL/L (ref 136–145)
T AXIS: 33 DEGREES
TROPONIN I SERPL HS-MCNC: 11 NG/L
TSI SER-ACNC: 0.77 MIU/ML (ref 0.55–4.78)
VENTRICULAR RATE: 67 BPM
WBC # BLD AUTO: 8.6 X10(3) UL (ref 4–11)

## 2024-07-17 PROCEDURE — 85652 RBC SED RATE AUTOMATED: CPT | Performed by: EMERGENCY MEDICINE

## 2024-07-17 PROCEDURE — 85025 COMPLETE CBC W/AUTO DIFF WBC: CPT

## 2024-07-17 PROCEDURE — 83880 ASSAY OF NATRIURETIC PEPTIDE: CPT | Performed by: EMERGENCY MEDICINE

## 2024-07-17 PROCEDURE — 80048 BASIC METABOLIC PNL TOTAL CA: CPT

## 2024-07-17 PROCEDURE — 93306 TTE W/DOPPLER COMPLETE: CPT | Performed by: EMERGENCY MEDICINE

## 2024-07-17 PROCEDURE — 93005 ELECTROCARDIOGRAM TRACING: CPT

## 2024-07-17 PROCEDURE — 99285 EMERGENCY DEPT VISIT HI MDM: CPT

## 2024-07-17 PROCEDURE — 93010 ELECTROCARDIOGRAM REPORT: CPT

## 2024-07-17 PROCEDURE — 84443 ASSAY THYROID STIM HORMONE: CPT | Performed by: EMERGENCY MEDICINE

## 2024-07-17 PROCEDURE — 36415 COLL VENOUS BLD VENIPUNCTURE: CPT

## 2024-07-17 PROCEDURE — 71260 CT THORAX DX C+: CPT | Performed by: EMERGENCY MEDICINE

## 2024-07-17 PROCEDURE — 71045 X-RAY EXAM CHEST 1 VIEW: CPT | Performed by: EMERGENCY MEDICINE

## 2024-07-17 PROCEDURE — 84484 ASSAY OF TROPONIN QUANT: CPT | Performed by: EMERGENCY MEDICINE

## 2024-07-17 NOTE — ED INITIAL ASSESSMENT (HPI)
Patient aox3 to ed via private vehicle, co of queenie with exertion x 2 weeks, sent by ob for further eval. Patient approx 14 weeks . Denies chest pain.

## 2024-07-17 NOTE — ED PROVIDER NOTES
Patient Seen in: [facility] Emergency Department      History     Chief Complaint   Patient presents with    Difficulty Breathing           Pregnancy Issues     Stated Complaint: SOB..... 14 weeks preg    Subjective:   HPI    Presents the emergency department complaining of 2 to 3 weeks of exertional dyspnea.  She states that she is 14 weeks pregnant, G1, P0 and states that she has been noticeably short of breath when she walks up and down the stairs.  There is no fever, chills, cough or cold symptoms.  There is no other aggravating or alleviating factors.    Objective:   History reviewed. No pertinent past medical history.           Past Surgical History:   Procedure Laterality Date    Other surgical history  2011    Bunion Surgery    [place] teeth removed Bilateral 2013                No pertinent social history.            Review of Systems    Positive for stated Chief Complaint: Difficulty Breathing (/) and Pregnancy Issues    Other systems are as noted in HPI.  Constitutional and vital signs reviewed.      All other systems reviewed and negative except as noted above.    Physical Exam     ED Triage Vitals [07/17/24 1331]   /83   Pulse 81   Resp 18   Temp 98 °F (36.7 °C)   Temp src    SpO2 100 %   O2 Device None (Room air)       Current Vitals:   Vital Signs  BP: 122/77  Pulse: 77  Resp: 18  Temp: 98 °F (36.7 °C)    Oxygen Therapy  SpO2: 99 %  O2 Device: None (Room air)            Physical Exam  Vitals and nursing note reviewed.   Constitutional:       General: She is not in acute distress.     Appearance: She is well-developed.   HENT:      Head: Normocephalic.      Nose: Nose normal.      Mouth/Throat:      Mouth: Mucous membranes are moist.   Eyes:      Conjunctiva/sclera: Conjunctivae normal.   Cardiovascular:      Rate and Rhythm: Normal rate and regular rhythm.      Heart sounds: No murmur heard.  Pulmonary:      Effort: Pulmonary effort is normal. No respiratory distress.      Breath sounds:  Normal breath sounds.   Abdominal:      General: There is no distension.      Palpations: Abdomen is soft.      Tenderness: There is no abdominal tenderness.   Musculoskeletal:         General: No tenderness. Normal range of motion.      Cervical back: Normal range of motion and neck supple.   Skin:     General: Skin is warm and dry.      Capillary Refill: Capillary refill takes less than 2 seconds.      Findings: No rash.   Neurological:      General: No focal deficit present.      Mental Status: She is alert and oriented to person, place, and time.      Cranial Nerves: No cranial nerve deficit.      Motor: No weakness.               ED Course     Labs Reviewed   BASIC METABOLIC PANEL (8) - Abnormal; Notable for the following components:       Result Value    Glucose 140 (*)     BUN 7 (*)     BUN/CREA Ratio 8.4 (*)     All other components within normal limits   TROPONIN I HIGH SENSITIVITY - Normal   BNP (B TYPE NATRIURETIC PEPTIDE) - Normal   SED RATE, WESTERGREN (AUTOMATED) - Normal   TSH W REFLEX TO FREE T4 - Normal   CBC WITH DIFFERENTIAL WITH PLATELET    Narrative:     The following orders were created for panel order CBC With Differential With Platelet.  Procedure                               Abnormality         Status                     ---------                               -----------         ------                     CBC W/ DIFFERENTIAL[[id number]]                              Final result                 Please view results for these tests on the individual orders.   RAINBOW DRAW LAVENDER   RAINBOW DRAW LIGHT GREEN   RAINBOW DRAW BLUE   CBC W/ DIFFERENTIAL     EKG    Rate, intervals and axes as noted on EKG Report.  Rate: 67 bpm  Rhythm:Sinus Rhythm  Reading:  Normal EKG                          MDM                        Medical Decision Making  Differential diagnosis considered for pericardial effusion, PE, pneumonia, cardiomyopathy, anemia.    Problems Addressed:  14 weeks gestation of pregnancy  (HCC): acute illness or injury  Dyspnea, unspecified type: acute illness or injury    Amount and/or Complexity of Data Reviewed  Labs: ordered. Decision-making details documented in ED Course.     Details: Normal troponin, BNP, chemistry panel, CBC, thyroid study.  Radiology: ordered and independent interpretation performed. Decision-making details documented in ED Course.     Details: Echocardiogram shows normal ventricular function normal chambers and normal valves.  CT scan shows no evidence of PE.    ECG/medicine tests: ordered and independent interpretation performed. Decision-making details documented in ED Course.  Discussion of management or test interpretation with external provider(s): Recommend patient stays well-hydrated, follow-up with primary care physician and GYN doctor.        Disposition and Plan     Clinical Impression:  1. Dyspnea, unspecified type    2. 14 weeks gestation of pregnancy (HCC)    3. Hyperglycemia         Disposition:  Discharge  7/17/2024  5:26 pm    Follow-up:  Wayne Merida MD  [address] [place] [id number]  [phone]    Schedule an appointment as soon as possible for a visit            Medications Prescribed:  Discharge Medication List as of 7/17/2024  5:29 PM

## 2024-07-17 NOTE — TELEPHONE ENCOUNTER
Patient contacted.  She reports \"I'm feeling great, it's only with activity\".  She states she obtained a referral to cardiology from the midwives who also recommended emergency room for quicker evaluation.  She states she will go today.  Flagged for follow up.

## 2024-08-01 ENCOUNTER — ROUTINE PRENATAL (OUTPATIENT)
Dept: OBGYN CLINIC | Facility: CLINIC | Age: 29
End: 2024-08-01
Payer: COMMERCIAL

## 2024-08-01 VITALS
SYSTOLIC BLOOD PRESSURE: 117 MMHG | HEART RATE: 80 BPM | BODY MASS INDEX: 22.1 KG/M2 | DIASTOLIC BLOOD PRESSURE: 78 MMHG | WEIGHT: 163.19 LBS | HEIGHT: 72 IN

## 2024-08-01 DIAGNOSIS — Z34.92 PRENATAL CARE, SECOND TRIMESTER (HCC): Primary | ICD-10-CM

## 2024-08-01 NOTE — PROGRESS NOTES
Cleopatra denies vaginal bleeding or pelvic pain. Has not started feeling fetal movement.     She was seen in the ED due to SOB with a negative workup. Recommend following up with cardiology and PCP.     Instructed her to schedule anatomy scan.    Reviewed warning signs and when to call. COMFORT 4wks

## 2024-08-02 ENCOUNTER — TELEPHONE (OUTPATIENT)
Dept: PERINATAL CARE | Facility: HOSPITAL | Age: 29
End: 2024-08-02

## 2024-08-02 ENCOUNTER — TELEPHONE (OUTPATIENT)
Dept: FAMILY MEDICINE CLINIC | Facility: CLINIC | Age: 29
End: 2024-08-02

## 2024-08-02 NOTE — TELEPHONE ENCOUNTER
Called patient, confirmed name and .    Patient states she is seeing cardiology . She would like to see Dr. Merida sooner.  Patient scheduled.      Future Appointments   Date Time Provider Department Center   2024 11:15 AM Wayne Merida MD [facility]   9/3/2024  2:30 PM Sherry Estevez CNM [facility]

## 2024-08-02 NOTE — TELEPHONE ENCOUNTER
Patient scheduled an appointment via [facility] for the following concern:    ER follow up for minor dyspnea in pregnancy     Is it okay for her to wait until her appointment on 09/03/2024?

## 2024-08-02 NOTE — TELEPHONE ENCOUNTER
Returned pt call RE scheduling.  No answer  Left Voice mail to call back with [facility] number  [phone]

## 2024-08-08 ENCOUNTER — OFFICE VISIT (OUTPATIENT)
Dept: FAMILY MEDICINE CLINIC | Facility: CLINIC | Age: 29
End: 2024-08-08
Payer: COMMERCIAL

## 2024-08-08 VITALS
SYSTOLIC BLOOD PRESSURE: 109 MMHG | BODY MASS INDEX: 22 KG/M2 | HEART RATE: 82 BPM | OXYGEN SATURATION: 99 % | DIASTOLIC BLOOD PRESSURE: 72 MMHG | WEIGHT: 162 LBS

## 2024-08-08 DIAGNOSIS — R06.09 DOE (DYSPNEA ON EXERTION): Primary | ICD-10-CM

## 2024-08-08 DIAGNOSIS — Z3A.17 17 WEEKS GESTATION OF PREGNANCY (HCC): ICD-10-CM

## 2024-08-08 PROCEDURE — 99214 OFFICE O/P EST MOD 30 MIN: CPT | Performed by: FAMILY MEDICINE

## 2024-08-08 NOTE — PROGRESS NOTES
HPI:    Cleopatra Chavez is a 29 year old female presents to clinic for ER follow-up.  She is approximately 17 weeks pregnant.  Over the past few weeks, she has noticed that when she walks quickly, goes up/down stairs, runs short distances she becomes winded.  Denies chest pain, palpitations.  Symptoms improve when she rests. She had a full cardiology workup in ER including an EKG, Echo, CT chest and lab work. Feels that symptoms have not worsened but have not changed much. Recalls not having much cardio capacity when she was younger, mainly lifts weights for exercise. Works as a labor RN.     HISTORY:  No past medical history on file.   Past Surgical History:   Procedure Laterality Date    Other surgical history  2011    Bunion Surgery    [place] teeth removed Bilateral 2013      Family History   Problem Relation Age of Onset    Thyroid disease Mother     Cancer Maternal Grandmother         Breast cancer    Diabetes Maternal Grandmother     Heart Disease Maternal Grandfather     Cancer Paternal Grandmother         Breast cancer and lung cancer    Heart Disease Paternal Grandfather     Cancer Paternal Grandfather         Lung Cancer    Heart Attack Paternal Grandfather       Social History:   Social History     Socioeconomic History    Marital status:    Tobacco Use    Smoking status: Never     Passive exposure: Never    Smokeless tobacco: Never   Substance and Sexual Activity    Alcohol use: Yes     Alcohol/week: 4.0 standard drinks of alcohol     Types: 1 Glasses of wine, 3 Standard drinks or equivalent per week    Drug use: Never     Social Determinants of Health     Financial Resource Strain: Low Risk  (6/27/2024)    Financial Resource Strain     Difficulty of Paying Living Expenses: Not hard at all     Med Affordability: No   Food Insecurity: No Food Insecurity (6/27/2024)    Food Insecurity     Food Insecurity: Never true   Transportation Needs: No Transportation Needs (6/27/2024)    Transportation  Needs     Lack of Transportation: No   Stress: No Stress Concern Present (6/27/2024)    Stress     Feeling of Stress : No   Housing Stability: Low Risk  (6/27/2024)    Housing Stability     Housing Instability: No        Medications (Active prior to today's visit):  Current Outpatient Medications   Medication Sig Dispense Refill    Bacillus Coagulans-Inulin (PROBIOTIC) 1-250 BILLION-MG Oral Cap       Calcium Citrate 250 MG Oral Tab       cholecalciferol (D3 5000) 125 MCG (5000 UT) Oral Cap       Magnesium 300 MG Oral Cap       Omega-3 Fatty Acids (OMEGA-3 EPA FISH OIL OR)       prenatal vitamin with DHA 27-0.8-228 MG Oral Cap Take 1 capsule by mouth daily.         Allergies:  No Known Allergies      Depression Screening (PHQ-2/PHQ-9): Over the LAST 2 WEEKS                         ROS:   Review of Systems   All other systems reviewed and are negative.      PHYSICAL EXAM:     Vitals:    08/08/24 1122   BP: 109/72   BP Location: Left arm   Patient Position: Sitting   Cuff Size: adult   Pulse: 82   SpO2: 99%   Weight: 162 lb (73.5 kg)     Physical Exam  Vitals reviewed.   Constitutional:       General: She is not in acute distress.  Cardiovascular:      Rate and Rhythm: Normal rate and regular rhythm.      Heart sounds: Normal heart sounds.   Pulmonary:      Effort: Pulmonary effort is normal. No respiratory distress.      Breath sounds: Normal breath sounds. No stridor. No wheezing or rhonchi.   Neurological:      Mental Status: She is alert. Mental status is at baseline.   Psychiatric:         Mood and Affect: Mood normal.         ASSESSMENT/PLAN:   (R06.09) CAPUTO (dyspnea on exertion)  (primary encounter diagnosis)  (Z3A.17) 17 weeks gestation of pregnancy ([facility])  Plan:   - Stable vitals, unremarkable physical exam.  ER documentation, labs, imaging reviewed with patient.  All within normal limits.  She has a follow-up appoint with cardiology in 2 weeks.  If symptoms persist/worsen, can also consider pulmonary function  test.  Okay to continue with physical activity as she tolerated, we will continue to follow.           Responsible party/patient verbalized understanding of information discussed. No barriers to learning observed.            Orders This Visit:  No orders of the defined types were placed in this encounter.      Meds This Visit:  Requested Prescriptions      No prescriptions requested or ordered in this encounter       Imaging & Referrals:  None     Chaperone offered at visit today.     The 21st Century cures Act makes medical notes like these available to patients in the interest of transparency.  However, be advised that this is a medical document.  It is intended as peer to peer communication.  It is written in medical language and may contain abbreviations or verbiage that are unfamiliar.  It may appear blunt or direct.  Medical documents are intended to carry relevant information, facts as evident, and the clinical opinion of the practitioner.      This note was created by [employer] voice recognition. Errors in content may be related to improper recognition by the system; efforts to review and correct have been done but errors may still exist. Please contact me with any questions.       8/8/2024  Wayne Merida MD

## 2024-08-14 ENCOUNTER — PATIENT MESSAGE (OUTPATIENT)
Dept: OBGYN CLINIC | Facility: CLINIC | Age: 29
End: 2024-08-14

## 2024-08-15 NOTE — TELEPHONE ENCOUNTER
From: Cleopatra Chavez  To: Sherry Estevez  Sent: 8/14/2024 8:20 AM CDT  Subject: Changing NATHAN to match my LMP    Hi Sherry,    I don’t see you until September 3rd but Yoly left a note in my chart about changing my NATHAN to match my LMP. It seems like the criteria they used to change it after my 8w scan may not be accurate. If this is the case, we can change it back to 1/9/25.   Thanks!

## 2024-08-28 ENCOUNTER — HOSPITAL ENCOUNTER (OUTPATIENT)
Dept: PERINATAL CARE | Facility: HOSPITAL | Age: 29
Discharge: HOME | End: 2024-08-28
Attending: ADVANCED PRACTICE MIDWIFE
Payer: COMMERCIAL

## 2024-08-28 VITALS
DIASTOLIC BLOOD PRESSURE: 49 MMHG | SYSTOLIC BLOOD PRESSURE: 95 MMHG | WEIGHT: 169 LBS | HEART RATE: 72 BPM | BODY MASS INDEX: 23 KG/M2

## 2024-08-28 DIAGNOSIS — Z36.89 ENCOUNTER FOR FETAL ANATOMIC SURVEY (HCC): Primary | ICD-10-CM

## 2024-08-28 DIAGNOSIS — Z36.89 ENCOUNTER FOR FETAL ANATOMIC SURVEY (HCC): ICD-10-CM

## 2024-08-28 DIAGNOSIS — Z03.75 SUSPECTED SHORTENING OF CERVIX NOT FOUND: ICD-10-CM

## 2024-08-28 PROCEDURE — 76817 TRANSVAGINAL US OBSTETRIC: CPT

## 2024-08-28 PROCEDURE — 76805 OB US >/= 14 WKS SNGL FETUS: CPT | Performed by: ADVANCED PRACTICE MIDWIFE

## 2024-08-28 NOTE — PROGRESS NOTES
OB ULTRASOUND REPORT   See imaging tab for complete ultrasound report or in PACS    Single IUP in breech presentation.    Placenta is anterior, high.   A 3 vessel cord is noted.  Cardiac activity is present at 145 bpm   g ( 0 lb 13 oz);   MVP is 5.2 cm .     The cervix was not able to be clearly visualized and appeared short by transabdominal ultrasound, therefore transvaginal ultrasound was performed. Transvaginal US findings: Cervix is closed, long and no funneling seen measuring 32 mm       Uterus and adnexa appeared normal  today on US      Fetal Anatomy:  Visualized with normal appearance: head, face, spine, neck, skin, chest, abdominal wall, gastrointestinal tract, kidneys, bladder, extremities.   Brain: Visualized and normal appearances: brain parenchyma, cerebral ventricles, choroid plexus, Cisterna Magna, midline falx, cerebellum, cerebellar lobes, posterior fossa, vermis, cavum septi pellucidi.  Face: eyes normal, profile normal, nose normal, lip normal, palate normal.  Heart: visualized and normal appearance: 3 vessel view, four-chamber, left outflow tract, right outflow tract, arches.      Genetic Sonogram:  Nuchal fold normal.  Pylelectasis absent.  No hyperechogenic bowel.  Echogenic intracardiac foci absent. Nasal bone present. Choroid plexus cyst absent.      Summary of Ultrasound findings:  This is a [facility] pregnancy    The fetal measurements are consistent with established EDC. No gross ultrasound evidence of structural abnormalities are seen today. No minor markers for aneuploidy are seen. The patient understands that ultrasound cannot rule out all structural and chromosomal abnormalities.       IMPRESSION:   1. IUP @  20w6d  2. Scan consistent with dates  3. No fetal structural abnormalities seen      Patient was scheduled for a Level 1 ultrasound today.   Patient was NOT seen by [facility] physician.  This was an ultrasound only encounter (no physician visit).  The ultrasound was read by  Dr. Artis and the report was sent to MsTai Armin to discuss with the patient.     Dakota Artis D.O.  Maternal Fetal Medicine     Note to patient and family  The 21st Century Cures Act makes medical notes available to patients in the interest of transparency.  However, please be advised that this is a medical document.  It is intended as rntv-ke-edfy communication.  It is written and medical language may contain abbreviations or verbiage that are technical and unfamiliar.  It may appear blunt or direct.  Medical documents are intended to carry relevant information, facts as evident, and the clinical opinion of the practitioner.

## 2024-08-28 NOTE — ADDENDUM NOTE
Encounter addended by: Lilia Ruiz on: 8/28/2024 10:01 AM   Actions taken: Visit diagnoses modified, Charge Capture section accepted

## 2024-09-03 ENCOUNTER — ROUTINE PRENATAL (OUTPATIENT)
Dept: OBGYN CLINIC | Facility: CLINIC | Age: 29
End: 2024-09-03
Payer: COMMERCIAL

## 2024-09-03 VITALS
SYSTOLIC BLOOD PRESSURE: 112 MMHG | HEART RATE: 88 BPM | BODY MASS INDEX: 23 KG/M2 | DIASTOLIC BLOOD PRESSURE: 72 MMHG | WEIGHT: 167 LBS

## 2024-09-03 DIAGNOSIS — Z34.92 PRENATAL CARE, SECOND TRIMESTER (HCC): Primary | ICD-10-CM

## 2024-09-03 NOTE — PATIENT INSTRUCTIONS
1. Book with many inspirational unmedicated natural birth stories: Kristyn May's Guide to Childbirth ($11)  https://www.SpectralCast/--Guide-Childbirth-Guy/dp/7203454459     2. Amanda Foy's Hypnobirthing [facility] Home Study Course (free preview on NETpeas, $55 for the whole download)  https://www.Rent [email or web address]/[email or web address]/playlists)  https://www.hypnAcetec Semiconductor/product/hypnobirthing-home-study-xiizxp-9-yh2-album-pack-download/     3. Marina Allison' Hypnobirthing Audio Book and Complete MP3 Collection ($27)  https://www.Ogorod/cds-and-downloads/cds-and-downloads/the-hypnobirthing-ebook-complete--mp3-collection/product/product.html    4. Alternative Hypnobirthing class:  https://thepositivebirthcompany.co.uk/

## 2024-09-03 NOTE — PROGRESS NOTES
Active fetus Denies any complaints.  Denies any vaginal bleeding, leaking of fluid or vaginal discharge. Level 1 ultrasound reviewed with patient.  Hypno-birthing resources given The Birth Partner recommended. Warning signs reviewed  All questions answered.

## 2024-09-05 ENCOUNTER — TELEPHONE (OUTPATIENT)
Dept: OBGYN CLINIC | Facility: CLINIC | Age: 29
End: 2024-09-05

## 2024-10-03 ENCOUNTER — ROUTINE PRENATAL (OUTPATIENT)
Dept: OBGYN CLINIC | Facility: CLINIC | Age: 29
End: 2024-10-03
Payer: COMMERCIAL

## 2024-10-03 VITALS
WEIGHT: 173.81 LBS | HEART RATE: 84 BPM | DIASTOLIC BLOOD PRESSURE: 70 MMHG | SYSTOLIC BLOOD PRESSURE: 105 MMHG | BODY MASS INDEX: 24 KG/M2

## 2024-10-03 DIAGNOSIS — Z34.93 PRENATAL CARE, THIRD TRIMESTER (HCC): Primary | ICD-10-CM

## 2024-10-03 DIAGNOSIS — Z11.3 SCREENING EXAMINATION FOR STI: ICD-10-CM

## 2024-10-03 PROCEDURE — 90471 IMMUNIZATION ADMIN: CPT | Performed by: ADVANCED PRACTICE MIDWIFE

## 2024-10-03 PROCEDURE — 90656 IIV3 VACC NO PRSV 0.5 ML IM: CPT | Performed by: ADVANCED PRACTICE MIDWIFE

## 2024-10-03 NOTE — PROGRESS NOTES
Feeling well. Endorses regular fetal movement. Denies contractions, LOF, vaginal bleeding.     Discussed third trimester labs. Order placed and patient instructed to complete at 28 weeks.    Flu vaccine give    Plan:  COMFORT 2-4 weeks    Reviewed third trimester warning signs and when to call.

## 2024-10-18 ENCOUNTER — TELEPHONE (OUTPATIENT)
Dept: OBGYN CLINIC | Facility: CLINIC | Age: 29
End: 2024-10-18

## 2024-10-18 ENCOUNTER — LAB ENCOUNTER (OUTPATIENT)
Dept: LAB | Facility: HOSPITAL | Age: 29
End: 2024-10-18
Attending: ADVANCED PRACTICE MIDWIFE
Payer: COMMERCIAL

## 2024-10-18 ENCOUNTER — ROUTINE PRENATAL (OUTPATIENT)
Dept: OBGYN CLINIC | Facility: CLINIC | Age: 29
End: 2024-10-18
Payer: COMMERCIAL

## 2024-10-18 VITALS
WEIGHT: 174 LBS | HEART RATE: 73 BPM | HEIGHT: 71 IN | SYSTOLIC BLOOD PRESSURE: 106 MMHG | BODY MASS INDEX: 24.36 KG/M2 | DIASTOLIC BLOOD PRESSURE: 69 MMHG

## 2024-10-18 DIAGNOSIS — Z34.03 PRENATAL CARE, FIRST PREGNANCY IN THIRD TRIMESTER (HCC): Primary | ICD-10-CM

## 2024-10-18 DIAGNOSIS — O99.810 GLUCOSE INTOLERANCE OF PREGNANCY (HCC): ICD-10-CM

## 2024-10-18 DIAGNOSIS — Z34.93 PRENATAL CARE, THIRD TRIMESTER (HCC): ICD-10-CM

## 2024-10-18 DIAGNOSIS — Z11.3 SCREENING EXAMINATION FOR STI: ICD-10-CM

## 2024-10-18 PROBLEM — E74.39 GLUCOSE INTOLERANCE: Status: ACTIVE | Noted: 2024-10-18

## 2024-10-18 LAB
DEPRECATED RDW RBC AUTO: 42.3 FL (ref 35.1–46.3)
ERYTHROCYTE [DISTWIDTH] IN BLOOD BY AUTOMATED COUNT: 12.8 % (ref 11–15)
GLUCOSE 1H P GLC SERPL-MCNC: 149 MG/DL
HCT VFR BLD AUTO: 35.6 %
HGB BLD-MCNC: 12.4 G/DL
MCH RBC QN AUTO: 31.9 PG (ref 26–34)
MCHC RBC AUTO-ENTMCNC: 34.8 G/DL (ref 31–37)
MCV RBC AUTO: 91.5 FL
PLATELET # BLD AUTO: 235 10(3)UL (ref 150–450)
RBC # BLD AUTO: 3.89 X10(6)UL
T PALLIDUM AB SER QL IA: NONREACTIVE
WBC # BLD AUTO: 7.3 X10(3) UL (ref 4–11)

## 2024-10-18 PROCEDURE — 87389 HIV-1 AG W/HIV-1&-2 AB AG IA: CPT

## 2024-10-18 PROCEDURE — 85027 COMPLETE CBC AUTOMATED: CPT

## 2024-10-18 PROCEDURE — 82950 GLUCOSE TEST: CPT

## 2024-10-18 PROCEDURE — 86780 TREPONEMA PALLIDUM: CPT

## 2024-10-18 PROCEDURE — 36415 COLL VENOUS BLD VENIPUNCTURE: CPT

## 2024-10-18 NOTE — TELEPHONE ENCOUNTER
Yoly Mckeon, LG  [facility] Clinical Pool  Please call patient. 1hr elevated. She needs 3hr and Hgb A1c. Remaining labs normal. Thanks!    Pt seen in-office today with Idalia for return OB visit. Confirmed with Idalia that results were discussed with pt during visit. HA1C and 3hr GTT ordered.

## 2024-10-18 NOTE — PROGRESS NOTES
Cleopatra, , is at 28w1d, here for her COMFORT visit.  Currently, she is feeling well. Denies 3rd trimester danger signs. Desires Tdap today.     Vital signs and weight reviewed  See flowsheets  3T labs pending except 1hr GTT came back during visit and was 149    Assessment/Plan: 3T labs pending. Tdap given today. 3hr GTT ordered  Next visit: 2 weeks    Reviewed:   Prenatal visit schedule  Recommendations and rationale for Tdap vaccine(s) in pregnancy   labor precautions  Kick counts  Danger signs  Reviewed dating criteria and recommendation to change her dating to 7 week ultrasound. Will review with CNM group and recommend changing NATHAN    Pt verbalized understanding. All questions answered. No barriers to learning identified

## 2024-10-18 NOTE — PATIENT INSTRUCTIONS
Adapting to Pregnancy: Third Trimester    Although common during pregnancy, some discomforts may seem worse in the final weeks. Simple lifestyle changes can help. Take care of yourself. And ask your partner to help out with small tasks.  Limiting leg problems  Ways to combat leg issues:  Wear support hose all day.  Avoid snug shoes and clothes that bind, like tight pants and socks with elastic tops.  Sit with your feet and legs raised often.  Caring for your breasts  Tips to follow include:  Wash with plain water. Avoid using harsh soaps or rubbing alcohol. They may cause dryness.  Wear a nursing bra for extra support. It can also hide any leaks from your nipples.  Controlling hemorrhoids  Ways to avoid hemorrhoids include:  Eat foods that are high in fiber. Also, exercise and drink enough fluids. This will reduce constipation and hemorrhoids.  Sleep and nap on your side. This limits pressure on the veins of your rectum.  Try not to stand or sit for long periods.  Controlling back pain  As your body changes during pregnancy, your back must work in new ways. Back pain is due to many causes. Physical changes in your body can strain your back and its supporting muscles. Also, hormones (chemicals that carry messages throughout the body) increase during pregnancy. This can affect how your muscles and joints work together. All of these changes can lead to pain. Pain may be felt in the upper or lower back. Pain is also common in the pelvis. Some pregnant women have sciatica. This is pain caused by pressure on the sciatic nerve running down the back of the leg. Ask your healthcare provider for specific tips and exercises to help control your back pain.  Tips to help you rest  Good rest and sleep will help you feel better. Here are some ideas:  Ask your partner to massage your shoulders, neck, or back.  Limit the errands you do each day.  Lie down in the afternoon or after work for a few minutes.  Take a warm bath before  you go to sleep.  Drink warm milk or teas without caffeine.  Avoid coffee, black tea, and cola.  Stopping heartburn  Avoid spicy, greasy, fried, or acidic foods.  Eat small amounts more often. Eat slowly. Wait 2 hours after eating before lying down.  Sleep with your upper body raised 6 inches.   Managing mood swings  Ways to manage mood swings include:  Know that mood changes are normal.  Exercise often, but get plenty of rest.  Address any concerns and limit stress. Talking to your partner, other women, or your healthcare provider may help.  Dealing with urinary frequency  Tips to deal with having to urinate often include:  Drink plenty of water all day. If you drink a lot in the evening, though, you may have to get up more in the night.  Limit coffee, black tea, and cola.  [employer] last reviewed this educational content on 2/1/2018 © 2000-2020 The "[employer]. [address] [place] [address]. All rights reserved. This information is not intended as a substitute for professional medical care. Always follow your healthcare professional's instructions.        Pregnancy: Your Third Trimester Changes  As the baby grows, your body changes too. You may also see signs that your body is getting ready for labor. Be patient. Within a few more weeks, your baby will be born.  How you are changing  Your body is preparing for the birth of your baby. Some of the most common changes are listed below. If you have any questions or concerns, ask your healthcare provider:  You’ll gain more weight from fluids, extra blood, and fat deposits.  Your breasts will grow as your body gets ready to feed the baby. They may be more tender. You may also notice a slight yellow or white discharge from the nipples.  Discharge from your vagina may increase. This is normal.  You might see some skin color changes on your forehead, cheeks, or nose. Most of these will go away after you deliver.  How your baby is growing       Month  7  Your baby can open and close his or her eyes and weighs around 4 pounds. If born prematurely (too early), your baby would likely survive with special care. Month 8  Your baby is building up body fat and weighs around 6 pounds. Month 9  Your baby weighs nearly 7 pounds and is about 19 to 21 inches long. In other words, any day now...   StayWell last reviewed this educational content on 2018 The [employer]. [address] [place] [address], [place] [address]. All rights reserved. This information is not intended as a substitute for professional medical care. Always follow your healthcare professional's instructions.   Understanding  Labor  Going into labor before your 37th week of pregnancy is called  labor.  labor can cause your baby to be born too soon. This can lead to a number of health problems that may affect your baby.      Before labor, the cervix is thick and closed.      In  labor, the cervix begins to efface (thin) and dilate (open).   Symptoms of  labor   If you think you’re having  labor, get medical help right away. Contractions alone don’t mean you’re in  labor. What matters more are changes in your cervix (the lower end of the uterus). Symptoms of  labor include:   Four or more contractions per hour  Strong contractions  Constant menstrual-like cramping  Low-back pain  Mucous or bloody vaginal discharge  Bleeding or spotting in the second or third trimester  Evaluating  labor   Your healthcare provider will try to find out whether you’re in  labor or whether you’re just having contractions. He or she may watch you for a few hours. The following tests may be done:   Pelvic exam to see if your cervix has effaced (thinned) and dilated (opened)  Uterine activity monitoring to detect contractions  Fetal monitoring to check the health of your baby  Ultrasound to check your baby’s size and position  Amniocentesis to  check how mature your baby’s lungs are  Caring for yourself at home   If you have  contractions, but your cervix is still thick and closed, your healthcare provider may ask you to do the following at home:   Drink plenty of water.  Do fewer activities.  Rest in bed on your side.  Don't have intercourse or nipple stimulation.  When to call your healthcare provider   Call your healthcare provider if you notice any of these:   Four or more contractions per hour  Bag of water breaks  Bleeding or spotting  If you need hospital care    labor often requires that you have hospital care and complete bed rest. You may have an IV (intravenous) line to get fluids. You may be given pills or injections to help prevent contractions. Finally, you may get medicine (corticosteroids) that helps your baby’s lungs mature more quickly.   Are you at risk?   Any pregnant woman can have  labor. It may start for no reason. But these risk factors can increase your chances:   Past  labor or past early birth  Smoking, drug, or alcohol use during pregnancy  Multiple fetuses (twins or more)  Problems with the shape of the uterus  Bleeding during the pregnancy  The dangers of  birth   A baby born too soon may have health problems. This is because the baby didn’t have enough time to mature. Some of the risks for your baby include:   Not breastfeeding or feeding well  Having immature lungs  Bleeding in the brain  Dying  Reaching term   Your goal is to get as close to term as you can before giving birth. The closer you get to term, the higher your chance of having a healthy baby. Work with your healthcare provider. Together, you can take steps that may keep you from giving birth too early.   [employer] last reviewed this educational content on 3/1/2019  © 8149-9629 The [employer]. [address] [place] [address], [place] [address]. All rights reserved. This information is not intended as a substitute for  professional medical care. Always follow your healthcare professional's instructions.        Premature Labor    Premature labor ( labor) is when symptoms of labor occur before 37 weeks of pregnancy. (This is 3 weeks before your due date.) Premature labor can lead to premature delivery. This means giving birth to your baby early. Babies need at least 37 weeks of pregnancy for all the organs to develop normally. The earlier the delivery, the greater the risks to the baby.  In most cases, the cause of premature labor is unknown. But certain factors may make the problem more likely. These include:  History of premature labor with other pregnancies  Smoking  Alcohol or substance abuse  Low pre-pregnancy weight or weight gain during pregnancy  Short time period between pregnancies  Being pregnant with twins, triplets, or more  History of certain types of surgery on the cervix or uterus  Having a short cervix  Certain infections  There are a number of other risk factors. Ask your healthcare provider to help you understand the risk factors specific to your case. Then find out what you can do to control or reduce them.  Contractions are one of the main signs of premature labor. A contraction is different from cramping. It may feel painful and the belly (abdomen) may get hard. It can last from a few seconds to a few minutes. Some women may feel only a sense of pressure in the belly, thighs, rectum, or vagina. Some may feel only the hardening of the uterus without pain or pressure. Or there may be a constant pain in the lower back, which spreads forward toward the belly.Premature labor is often treated with medicines. A hospital stay may be needed. If labor doesn't progress and you and your baby are both healthy, you may be discharged to continue care at home.  Home care  Ask your provider any questions you have. Be certain you understand how to care for yourself at home. Also follow all recommendations given by your  healthcare providers.  Learn the signs of premature labor. Watch for these signs when you get home.  Limit or restrict activities as advised. This may include stopping certain physical activities and cutting back hours at work.  Avoid doing strenuous work as directed by your provider. Ask family and friends for help with tasks and support at home, if needed.  Don’t smoke, drink alcohol, or use other harmful substances.  Take steps to reduce stress.  Report any unusual symptoms to your provider.    Follow-up care  Follow up with your healthcare provider, or as directed. Weekly visits with your provider may be needed.  When to seek medical advice  Call your healthcare provider right away if any of these occur:  Regular or frequent contractions, whether they are painful or not  Pressure in the pelvis  Pressure in the lower belly or mild cramping in your belly with or without diarrhea  Constant low, dull backache  Gush or slow leaking of water from your vagina  Change in vaginal discharge (watery, mucus, or bloody)  Any vaginal bleeding  Decreased movement of your baby  [employer]ah last reviewed this educational content on 6/1/2018 © 2000-2020 The [employer]. [address] [place], [place] [address]. All rights reserved. This information is not intended as a substitute for professional medical care. Always follow your healthcare professional's instructions.        Understanding Preeclampsia  Preeclampsia is high blood pressure (hypertension) that happens during pregnancy. It often shows up around the 20th week of pregnancy. It often goes back to normal by the 12th week after you give birth. It can lead to serious health risks for you and your baby. During your pregnancy, your healthcare provider will watch your blood pressure.    Symptoms  A common symptom of preeclampsia is high blood pressure. Other symptoms may include:  Rapid weight gain  Protein in your urine  Headache  Belly (abdominal) pain on your  right side  Vision problems. These include flashes or spots.  Swelling (edema) in your face or hands. This also often happens near the end of normal pregnancies, even without preeclampsia.  Tests you may have  Your healthcare provider will want to check your blood pressure throughout your pregnancy. If your blood pressure is high, you may have the following tests:  Urine tests to look for protein  Blood tests to confirm preeclampsia  Fetal monitoring to make sure that your baby is healthy  Treating preeclampsia  You may need to take a daily low dose of aspirin if you are at risk for preeclampsia. Preeclampsia almost always ends soon after you give birth. Until then, your healthcare provider can help manage your condition. If your symptoms are mild, you may need activity limits at home, including bed rest and no heavy lifting. If your symptoms are severe, you will stay in the hospital. Hospital treatment includes:  Activity limits to help control blood pressure. This means no heavy lifting and 8 hours per day lying down with the feet up.  Magnesium IV (intravenous) drip during labor to prevent seizures  Induced labor or surgical delivery by  section. Delivery is considered the cure for preeclampsia.  When to call your healthcare provider  Call your healthcare provider if swelling, weight gain, or other symptoms come on quickly or are severe. Some cases of preeclampsia are more severe than others. Your symptoms also may change or get worse as you get closer to your due date.  Who’s at risk?  No one knows what causes preeclampsia. Preeclampsia can happen in any pregnant woman. But it is more common in first-time pregnancies. Things that increase the risk include:  Previous pregnancies. You are at risk if you had preeclampsia, intrauterine growth retardation (IUGR),  birth, placental abruption, or fetal death in a past pregnancy.  Health history of mother. You are at risk if you have diabetes, high blood  pressure, obesity, kidney disease, autoimmune disease such as lupus, or a family history of preeclampsia.  Current pregnancy. You are at risk if this is your first pregnancy, or if you have multiple fetuses, are younger than age 18 or older than 40, or used in vitro fertilization.  Race. You are at risk if you are black.  Dangers of preeclampsia  If not treated, preeclampsia can cause problems for you and your baby. The placenta is the organ that nourishes your baby. It may tear away from the uterine wall. This can put the baby at risk for health problems (fetal distress) and premature birth. Preeclampsia can also cause these health problems:  Kidney failure or other organ damage  Seizures  Stroke  Once you give birth  In most cases, preeclampsia goes away on its own soon after you give birth. This is often by the 12th week after you give deliver. Within days of delivery, your blood pressure, swelling, and other symptoms should get better. For some women, problems from preeclampsia can continue after delivery.  Postpartum preeclampsia that develops within the first 48 hours after delivery is rare. Another type of postpartum preeclampsia that develops more than 48 hours after delivery is called late-onset preeclampsia. It is also rare. Contact your healthcare provider right away if you have symptoms of preeclampsia after you deliver.  [employer] last reviewed this educational content on 12/1/2019  © 2754-4605 The [employer]. [address] [place] [address], [place] [address]. All rights reserved. This information is not intended as a substitute for professional medical care. Always follow your healthcare professional's instructions.        Kick Counts    It’s normal to worry about your baby’s health. One way you can know your baby’s doing well is to record the baby’s movements once a day. This is called a kick count. Remember to take your kick count records to all your appointments with your healthcare provider.  How  to count kicks  Time how long it takes you to feel 10 kicks, flutters, swishes, or rolls. Ideally, you want to feel at least 10 movements within 2 hours. You will likely feel 10 movements in less time than that.  Starting at 28 weeks, count your baby's movements daily. Follow your healthcare provider's instructions for kick counting. Here are tips for counting kicks:  Choose a time when the baby is active, such as after a meal.   Sit comfortably or lie on your side.   The first time the baby moves, write down the time.   Count each movement until the baby has moved 10 times. This can take from 20 minutes to 2 hours.   If you have not felt 10 kicks by the end of the second hour, wait a few hours. Then try again.  Try to do it at the same time each day.  When to call your healthcare provider  Call your healthcare provider right away if:  You do a couple sets of kick counts during the day and your baby moves fewer than 10 times in 2 hours  Your baby moves much less often than on the days before.  You have not felt your baby move all day.  [employer] last reviewed this educational content on 12/1/2017  © 5303-3072 The [employer], [employer]. [address] [place] [address], [place] [address]. All rights reserved. This information is not intended as a substitute for professional medical care. Always follow your healthcare professional's instructions.

## 2024-10-23 ENCOUNTER — PATIENT MESSAGE (OUTPATIENT)
Dept: OBGYN CLINIC | Facility: CLINIC | Age: 29
End: 2024-10-23

## 2024-10-23 ENCOUNTER — TELEPHONE (OUTPATIENT)
Dept: OBGYN CLINIC | Facility: CLINIC | Age: 29
End: 2024-10-23

## 2024-10-23 ENCOUNTER — TELEPHONE (OUTPATIENT)
Dept: PHYSICAL THERAPY | Facility: HOSPITAL | Age: 29
End: 2024-10-23

## 2024-10-23 NOTE — TELEPHONE ENCOUNTER
Pt name and  verified     Informed pt message has been routed to LG Friedman. Pt aware we will reach out once LG Friedman, approves order for pelvic floor therapy. Pt verbalized understanding.     See mychart message.

## 2024-10-23 NOTE — TELEPHONE ENCOUNTER
Pravin Friedman,   I’m trying to schedule a pelvic floor therapy appointment at [facility] but they’re requiring an order from my provider. Is it possible for you or another midwife to put one in so that I can try to get in for an appointment?      Thanks!  Cleopatra

## 2024-10-24 ENCOUNTER — LABORATORY ENCOUNTER (OUTPATIENT)
Dept: LAB | Age: 29
End: 2024-10-24
Attending: ADVANCED PRACTICE MIDWIFE
Payer: COMMERCIAL

## 2024-10-24 DIAGNOSIS — N39.3 STRESS INCONTINENCE: ICD-10-CM

## 2024-10-24 DIAGNOSIS — O99.810 GLUCOSE INTOLERANCE OF PREGNANCY (HCC): ICD-10-CM

## 2024-10-24 DIAGNOSIS — O26.899 PELVIC PRESSURE IN PREGNANCY (HCC): ICD-10-CM

## 2024-10-24 DIAGNOSIS — R10.2 PELVIC PRESSURE IN PREGNANCY (HCC): ICD-10-CM

## 2024-10-24 DIAGNOSIS — Z34.03 PRENATAL CARE, FIRST PREGNANCY IN THIRD TRIMESTER (HCC): ICD-10-CM

## 2024-10-24 DIAGNOSIS — Z3A.28 28 WEEKS GESTATION OF PREGNANCY (HCC): Primary | ICD-10-CM

## 2024-10-24 LAB
EST. AVERAGE GLUCOSE BLD GHB EST-MCNC: 94 MG/DL (ref 68–126)
GLUCOSE 1H P GLC SERPL-MCNC: 117 MG/DL
GLUCOSE 2H P GLC SERPL-MCNC: 99 MG/DL
GLUCOSE 3H P GLC SERPL-MCNC: 39 MG/DL (ref 70–140)
GLUCOSE P FAST SERPL-MCNC: 77 MG/DL
HBA1C MFR BLD: 4.9 % (ref ?–5.7)

## 2024-10-24 PROCEDURE — 82951 GLUCOSE TOLERANCE TEST (GTT): CPT

## 2024-10-24 PROCEDURE — 82952 GTT-ADDED SAMPLES: CPT

## 2024-10-24 PROCEDURE — 36415 COLL VENOUS BLD VENIPUNCTURE: CPT

## 2024-10-24 PROCEDURE — 83036 HEMOGLOBIN GLYCOSYLATED A1C: CPT

## 2024-10-24 NOTE — TELEPHONE ENCOUNTER
Pt name and  verified     Called pt to follow up on 3h gtt test. Pt states Idalia had called her after the test. Pt felt clammy and lightheaded after the test but reports she is feeling better. Advised pt to call office if she has any concerns.

## 2024-10-25 ENCOUNTER — TELEPHONE (OUTPATIENT)
Dept: PHYSICAL THERAPY | Facility: HOSPITAL | Age: 29
End: 2024-10-25

## 2024-10-28 ENCOUNTER — TELEPHONE (OUTPATIENT)
Dept: PHYSICAL THERAPY | Facility: HOSPITAL | Age: 29
End: 2024-10-28

## 2024-10-28 ENCOUNTER — OFFICE VISIT (OUTPATIENT)
Dept: PHYSICAL THERAPY | Facility: HOSPITAL | Age: 29
End: 2024-10-28
Attending: ADVANCED PRACTICE MIDWIFE
Payer: COMMERCIAL

## 2024-10-28 DIAGNOSIS — R10.2 PELVIC PRESSURE IN PREGNANCY (HCC): ICD-10-CM

## 2024-10-28 DIAGNOSIS — O26.899 PELVIC PRESSURE IN PREGNANCY (HCC): ICD-10-CM

## 2024-10-28 DIAGNOSIS — Z3A.28 28 WEEKS GESTATION OF PREGNANCY (HCC): Primary | ICD-10-CM

## 2024-10-28 DIAGNOSIS — N39.3 STRESS INCONTINENCE: ICD-10-CM

## 2024-10-28 PROCEDURE — 97161 PT EVAL LOW COMPLEX 20 MIN: CPT

## 2024-10-28 PROCEDURE — 97112 NEUROMUSCULAR REEDUCATION: CPT

## 2024-10-28 PROCEDURE — 97110 THERAPEUTIC EXERCISES: CPT

## 2024-10-28 NOTE — PROGRESS NOTES
MUSCULOSKELETAL AND PELVIC FLOOR EVALUATION:     Diagnosis:   28 weeks gestation of pregnancy (Formerly [facility]) (Z3A.28)  Pelvic pressure in pregnancy (HCC) (O26.899,R10.2)  Stress incontinence (N39.3)         Referring Provider: Idalia Saenz  Date of Evaluation:    10/28/2024    Precautions:   pregnant Next MD visit:   none scheduled  Date of Surgery: n/a     PATIENT SUMMARY   Cleopatra Chavez is a 29 year old female  who presents to therapy today with complaints of leakage when coughed, having more hesitation with emptying bladder, has had urine dribbling, having a lot pressure in pelvic region. Has started taking colace when constipated which also increases difficulty starting stream and has more pelvic pressure.   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 Pt goals include learn exercises to do while pregnant.  Past medical history was reviewed with Cleopatra. Significant findings include  has no past medical history on file.     URINARY HABITS  Types of symptoms: stress incontinence, nocturia, and other: hesitation  Events associated with the onset of urinary complaints: pregnancy  Abdominal/Vaginal Pressure complaints: yes  Urinary Frequency: 7x/day  Urine Stream: hesitation  Amount: normal  Leaking occurs:occasionally  Nocturia: 2  Fluid Intake: water  Bladder irritants: 1 caff/day  Urine Stop test: not asked  Post void dribble: yes  Hovering: no  Empty bladder just in case: no  Do you ever leak urine without knowing it? no  Difficulty emptying bladder    BOWEL HABITS  Types of symptoms: none   Frequency of bowel  movements: 1x/day  Do you strain with defecation: No     SEXUAL HEALTH STATUS  No pain with intercourse    ASSESSMENT  Cleopatra presents to physical therapy evaluation with primary c/o pelvic pressure. The results of the objective tests and measures show  decreased pelvic floor muscle strength and coordination, fair bladder habits, decreased core strength. Functional deficits include but are not limited to difficulty emptying bladder.  Signs and symptoms are consistent with diagnosis of 28 weeks gestation of pregnancy (HCC) (Z3A.28)Pelvic pressure in pregnancy (HCC) (O26.899,R10.2)Stress incontinence (N39.3). Pt and PT discussed evaluation findings, pathology, POC and HEP.  Pt voiced understanding and performs HEP correctly without reported pain. Skilled Pelvic Physical Therapy is medically necessary to address the above impairments and reach functional goals.    OBJECTIVE:   Posture: rounded shoulders, increased lumbar lordosis  Pelvic Alignment: symmetrical   Gait: pt ambulates on level ground with normal mechanics.     External Palpation:increased B lumbar paraspinal tension  Scars (location/surgery): none  Abdominal Wall Integrity: pregnant    Range Of Motion  Lumbar AROM screen: wfl-back tenderness with flexion and extension  LE AROM screen: not assessed secondary to time constraints     Strength (MMT) 5/5 DAISHA LE   Transverse Abdominis: 1/5    Flexibility Summary: WNL DAISHA LE     Informed consent for internal pelvic evaluation given: Yes    External Observation:   Voluntary contraction: present   Voluntary relaxation: present  Involuntary contraction: present  Involuntary relaxation: present    Mons pubis: WNL  Labia majora: WNL  Labia minora: WNL  Urethral meatus: WNL  Introitus: WNL  Perineal body: WNL    Sensory/Reflex:  Vestibule: normal bilaterally  Anal [place]: Not Tested    Internal Examination   Pelvic Floor Muscle strength: (PERF= Power/Endurance/Reps/Fast) MMT: 2/2/5/2  External Anal Sphincter:  nt  Accessory Muscle Use: abdominals    Tissue Laxity Test:  Anterior Wall: WNL  Posterior Wall: WNL  Apical: WNL    Eccentric lengthening contraction: wnl  Bearing down Valsalva maneuver (2-3x): nwl    Internal Palpation: WNL   Today's Treatment and Response:   Patient education was provided on objective findings of external and internal evaluation and expectations with treatment outcomes. Educated on pelvic anatomy and function with diagrams and pelvic model, bladder normatives, adequate hydration levels, proper toileting posture, stress/urge urinary incontinence strategies, coordination of diaphragmatic breathing and pelvic floor contraction , and knack/pelvic muscle brace, perineal massage -35 weeks PG handout issues, bladder emptying, pregnancy belt-V2    Manual therapy/NM Re-ed- internal retraining pelvic floor muscles to learn to perform diaphragmatic breathing , abdominal bracing , kegel     Patient was instructed in and issued a HEP for: diet/bladder/bowel diary, diaphragmatic breathing/kegel 1 min TID    Charges: PT Eval Low Complexity, TE 10 NM 15      Total Timed Treatment: 25 min     Total Treatment Time: 45 min     PLAN OF CARE:    Goals: (to be met in 10 visits)  1. Independent in HEP and progression with improved understanding of bladder health, bladder retraining and long-term management.    2. Patient will demonstrate improved bladder voiding habits to voiding every 2 hours with less hesitancy and more complete emptying   3. Patient will demonstrate improve PFM isolation, coordination and strength to so she is able to reduce urinary urge and prevent leakage during ADL's.  4. Pt will have increased transverse abdominis muscle strength to 2/5 to assist with supporting pelvic floor muscles.   5. PFM contraction before increase intra-abdominal pressure.  6. Pt will improve transversus abdominis recruitment to perform proper isometric contraction without requiring verbal or tactile cuing to promote  advancement of therex  7. Pt will demonstrate good understanding of proper posture and body mechanics to decrease pain and improve spinal safety       Frequency / Duration: Patient will be seen for 1x/week or a total of 10 visits over a 90 day period.  Treatment will include: Manual Therapy, Neuromuscular Re-education, Self-Care Home Management, Therapeutic Activities, Therapeutic Exercise, and Home Exercise Program instruction     Education or treatment limitation: None  Rehab Potential:good      Patient/Family/Caregiver was advised of these findings, precautions, and treatment options and has agreed to actively participate in planning and for this course of care.    Thank you for your referral. Please co-sign or sign and return this letter via fax as soon as possible to [phone]. If you have any questions, please contact me at Dept: [phone]    Sincerely,  Electronically signed by therapist: Abimbola Perera, PT  Physician's certification required: Yes  I certify the need for these services furnished under this plan of treatment and while under my care.    X___________________________________________________ Date____________________    Certification From: 10/28/2024  To:1/26/2025

## 2024-10-31 ENCOUNTER — ROUTINE PRENATAL (OUTPATIENT)
Dept: OBGYN CLINIC | Facility: CLINIC | Age: 29
End: 2024-10-31
Payer: COMMERCIAL

## 2024-10-31 VITALS
BODY MASS INDEX: 25 KG/M2 | HEART RATE: 88 BPM | WEIGHT: 180 LBS | SYSTOLIC BLOOD PRESSURE: 102 MMHG | DIASTOLIC BLOOD PRESSURE: 70 MMHG

## 2024-10-31 DIAGNOSIS — Z34.03 PRENATAL CARE, FIRST PREGNANCY IN THIRD TRIMESTER (HCC): Primary | ICD-10-CM

## 2024-10-31 NOTE — PROGRESS NOTES
Feeling well. Endorses regular fetal movement. Denies LOF, vaginal bleeding. Has sondra marrero that come and go. Some days she has them more frequently then others. Not painful and no change in intensity or frequency. Has been occurring since 20wks. SVE:closed/thick/high. Recommend continuing to monitor. Call with increasing intensity or frequency.    Also reports vulvar pressure. PT recommended support bands. Discussed this versus support underwear.     Plan:  COMFORT 2 weeks    Reviewed third trimester warning signs and when to call.

## 2024-11-04 ENCOUNTER — OFFICE VISIT (OUTPATIENT)
Dept: PHYSICAL THERAPY | Age: 29
End: 2024-11-04
Attending: FAMILY MEDICINE
Payer: COMMERCIAL

## 2024-11-04 PROCEDURE — 97140 MANUAL THERAPY 1/> REGIONS: CPT

## 2024-11-04 PROCEDURE — 97112 NEUROMUSCULAR REEDUCATION: CPT

## 2024-11-04 NOTE — PROGRESS NOTES
Dx: 28 weeks gestation of pregnancy (Formerly [facility]) (Z3A.28)  Pelvic pressure in pregnancy (Formerly [facility]) (O26.899,R10.2)  Stress incontinence (N39.3)         Insurance (Authorized # of Visits):  [employer] 10 per POC           Authorizing Physician: Dr. Marvin MEYER visit: none scheduled  Fall Risk: standard         Precautions: n/a           Subjective: Vulvar pressure.  Not emptying well.  Stream won't start.  Colace everyday. Drinking water all the time. Can void in the shower.      Objective:   Informed consent for internal pelvic evaluation given: Yes    Internal Examination   Mons pubis: WNL  Labia majora: WNL  Labia minora: WNL  Urethral meatus: WNL  Introitus: WNL  Perineal body: WNL    Internal Palpation Left Right Comments   Superficial Transverse perineal moderate restriction moderate restriction    Bulbocavernosus moderate restriction moderate restriction    Ischiocavernosus moderate restriction moderate restriction    Deep Transverse Perineal moderate restriction moderate restriction    Compress Urethrae/Spinc. Urethrovaginalis   moderate restriction moderate restriction    Pubococcygeus/Pubovaginalis moderate restriction moderate restriction    Iliococcygeus moderate restriction moderate restriction    Coccygeus moderate restriction moderate restriction    Piriformis moderate restriction moderate restriction    Obturator internus Moderate restriction moderate restriction        Assessment: Patient presents with difficulty voiding. Performed PF internal assessment. Patient has moderate tension in layers 2 and 3 of PF.  Performed manual release and stretch to all musculature to allow for relaxation of PF.  Difficulty voiding after treatment. Instructed on PF stretches, deep breathing and double voiding to allow for improved voiding.       Goals:   (to be met in 10 visits)  1. Independent in HEP and progression with improved understanding of bladder health, bladder retraining and long-term management.    2. Patient will demonstrate  improved bladder voiding habits to voiding every 2 hours with less hesitancy and more complete emptying   3. Patient will demonstrate improve PFM isolation, coordination and strength to so she is able to reduce urinary urge and prevent leakage during ADL's.  4. Pt will have increased transverse abdominis muscle strength to 2/5 to assist with supporting pelvic floor muscles.   5. PFM contraction before increase intra-abdominal pressure.  6. Pt will improve transversus abdominis recruitment to perform proper isometric contraction without requiring verbal or tactile cuing to promote advancement of therex  7. Pt will demonstrate good understanding of proper posture and body mechanics to decrease pain and improve spinal safety        Plan: continue with downtraining  Date: 11/4/2024  TX#: 2/10 Date:                 TX#: 3/ Date:                 TX#: 4/ Date:                 TX#: 5/ Date:   Tx#: 6/   TherEx:       Manual/NM Godwin:  Internal check  DTM and manual stretch to layers 2 and 3 of PF       NeuroMuscular Godwin       Self care Home management:       HEP: toileting posture, happy baby, vipin pose, deep squat, double voiding    Charges: 2MAN 30 min 1NM 10 min       Total Timed Treatment: 40 min  Total Treatment Time: 45 min

## 2024-11-11 ENCOUNTER — OFFICE VISIT (OUTPATIENT)
Dept: PHYSICAL THERAPY | Age: 29
End: 2024-11-11
Attending: FAMILY MEDICINE
Payer: COMMERCIAL

## 2024-11-11 PROCEDURE — 97112 NEUROMUSCULAR REEDUCATION: CPT

## 2024-11-11 PROCEDURE — 97140 MANUAL THERAPY 1/> REGIONS: CPT

## 2024-11-11 NOTE — PROGRESS NOTES
Dx: 28 weeks gestation of pregnancy (HCC) (Z3A.28)  Pelvic pressure in pregnancy (HCC) (O26.899,R10.2)  Stress incontinence (N39.3)         Insurance (Authorized # of Visits):  [employer] 10 per POC           Authorizing Physician: Dr. Marvin MEYER visit: none scheduled  Fall Risk: standard         Precautions: n/a           Subjective:  could pee in the shower afterwards. Not able to void when in different situations.  Comfortable at home and work.   Objective:  Mod tension in PF    Assessment: Patient presents with moderate tension in PF.  Able to void in comfortable places.  Focused on internal manual release. Improved tension after session.     Goals:   (to be met in 10 visits)  1. Independent in HEP and progression with improved understanding of bladder health, bladder retraining and long-term management.    2. Patient will demonstrate improved bladder voiding habits to voiding every 2 hours with less hesitancy and more complete emptying   3. Patient will demonstrate improve PFM isolation, coordination and strength to so she is able to reduce urinary urge and prevent leakage during ADL's.  4. Pt will have increased transverse abdominis muscle strength to 2/5 to assist with supporting pelvic floor muscles.   5. PFM contraction before increase intra-abdominal pressure.  6. Pt will improve transversus abdominis recruitment to perform proper isometric contraction without requiring verbal or tactile cuing to promote advancement of therex  7. Pt will demonstrate good understanding of proper posture and body mechanics to decrease pain and improve spinal safety        Plan: continue with downtraining  Date: 11/4/2024  TX#: 2/10 Date: 11/11/24                TX#: 3/10 Date:                 TX#: 4/ Date:                 TX#: 5/ Date:   Tx#: 6/   TherEx:       Manual/NM Godwin:  Internal check  DTM and manual stretch to layers 2 and 3 of PF Manual/NM Godwin:  DTM and manual stretch to layers 2 and 3 of PF      NeuroMuscular Godwin        Self care Home management:       HEP: toileting posture, happy baby, vipin pose, deep squat, double voiding    Charges: 2MAN 31 min 1NM 10 min       Total Timed Treatment: 41 min  Total Treatment Time: 44 min

## 2024-11-13 ENCOUNTER — ROUTINE PRENATAL (OUTPATIENT)
Dept: OBGYN CLINIC | Facility: CLINIC | Age: 29
End: 2024-11-13

## 2024-11-13 VITALS
DIASTOLIC BLOOD PRESSURE: 66 MMHG | HEART RATE: 73 BPM | BODY MASS INDEX: 26 KG/M2 | SYSTOLIC BLOOD PRESSURE: 112 MMHG | WEIGHT: 185 LBS

## 2024-11-13 DIAGNOSIS — R33.9 URINARY RETENTION: Primary | ICD-10-CM

## 2024-11-13 PROCEDURE — 90715 TDAP VACCINE 7 YRS/> IM: CPT | Performed by: ADVANCED PRACTICE MIDWIFE

## 2024-11-13 PROCEDURE — 90471 IMMUNIZATION ADMIN: CPT | Performed by: ADVANCED PRACTICE MIDWIFE

## 2024-11-13 NOTE — PROGRESS NOTES
Active fetus Denies any complaints.  Denies any vaginal bleeding, leaking of fluid or vaginal discharge.   No signs signs of PTL.  Reviewed S&S of PTL  Warning signs reviewed  All questions answered. P    Pt reports incomplete bladdr empting is better with PT  Urine culture sent

## 2024-11-21 ENCOUNTER — APPOINTMENT (OUTPATIENT)
Dept: PHYSICAL THERAPY | Facility: HOSPITAL | Age: 29
End: 2024-11-21
Attending: ADVANCED PRACTICE MIDWIFE
Payer: COMMERCIAL

## 2024-11-21 ENCOUNTER — OFFICE VISIT (OUTPATIENT)
Dept: PHYSICAL THERAPY | Age: 29
End: 2024-11-21
Attending: FAMILY MEDICINE
Payer: COMMERCIAL

## 2024-11-21 PROCEDURE — 97112 NEUROMUSCULAR REEDUCATION: CPT

## 2024-11-21 PROCEDURE — 97140 MANUAL THERAPY 1/> REGIONS: CPT

## 2024-11-21 NOTE — PROGRESS NOTES
Dx: 28 weeks gestation of pregnancy (HCC) (Z3A.28)  Pelvic pressure in pregnancy (HCC) (O26.899,R10.2)  Stress incontinence (N39.3)         Insurance (Authorized # of Visits):  [employer] 10 per POC           Authorizing Physician: Dr. Marvin MEYER visit: none scheduled  Fall Risk: standard         Precautions: n/a           Subjective: Voiding is getting better. Was sore last night.  Some pubic pain when standing on one leg.   Objective:  Mod tension in PF    Assessment: Patient presents with ttp in R layer 3 of PF.  Performed manual release to all layers with focus on R side.  Improved tension at end of session. She is progressing well towards goals     Goals:  (to be met in 10 visits)  1. Independent in HEP and progression with improved understanding of bladder health, bladder retraining and long-term management.    2. Patient will demonstrate improved bladder voiding habits to voiding every 2 hours with less hesitancy and more complete emptying   3. Patient will demonstrate improve PFM isolation, coordination and strength to so she is able to reduce urinary urge and prevent leakage during ADL's.  4. Pt will have increased transverse abdominis muscle strength to 2/5 to assist with supporting pelvic floor muscles.   5. PFM contraction before increase intra-abdominal pressure.  6. Pt will improve transversus abdominis recruitment to perform proper isometric contraction without requiring verbal or tactile cuing to promote advancement of therex  7. Pt will demonstrate good understanding of proper posture and body mechanics to decrease pain and improve spinal safety        Plan: continue with downtraining  Date: 11/4/2024  TX#: 2/10 Date: 11/11/24                TX#: 3/10 Date: 11/21/24                TX#: 4/10 Date:                 TX#: 5/ Date:   Tx#: 6/   TherEx:       Manual/NM Godwin:  Internal check  DTM and manual stretch to layers 2 and 3 of PF Manual/NM Godwin:  DTM and manual stretch to layers 2 and 3 of PF Manual/NM  Godwin:  DTM and manual stretch to layers 2 and 3 of PF     NeuroMuscular Godwin       Self care Home management:       HEP: toileting posture, happy baby, vipin pose, deep squat, double voiding    Charges: 2MAN 31 min 1NM 10 min       Total Timed Treatment: 41 min  Total Treatment Time: 44 min

## 2024-12-02 ENCOUNTER — OFFICE VISIT (OUTPATIENT)
Dept: PHYSICAL THERAPY | Age: 29
End: 2024-12-02
Attending: FAMILY MEDICINE
Payer: COMMERCIAL

## 2024-12-02 PROCEDURE — 97140 MANUAL THERAPY 1/> REGIONS: CPT

## 2024-12-02 PROCEDURE — 97112 NEUROMUSCULAR REEDUCATION: CPT

## 2024-12-02 NOTE — PROGRESS NOTES
Dx: 28 weeks gestation of pregnancy (HCC) (Z3A.28)  Pelvic pressure in pregnancy (HCC) (O26.899,R10.2)  Stress incontinence (N39.3)         Insurance (Authorized # of Visits):  [employer] 10 per POC           Authorizing Physician: Dr. Marvin MEYER visit: none scheduled  Fall Risk: standard         Precautions: n/a           Subjective: double voiding. Nocturia of 5x.  Not able to void in public places but easier.    Objective:  Mod tension in PF    Assessment: Patient presents with improving symptoms. Able to empty bladder easier. Still having a shy bladder in public places.  Tension in PF improving with manual work .     Goals:  (to be met in 10 visits)  1. Independent in HEP and progression with improved understanding of bladder health, bladder retraining and long-term management.    2. Patient will demonstrate improved bladder voiding habits to voiding every 2 hours with less hesitancy and more complete emptying   3. Patient will demonstrate improve PFM isolation, coordination and strength to so she is able to reduce urinary urge and prevent leakage during ADL's.  4. Pt will have increased transverse abdominis muscle strength to 2/5 to assist with supporting pelvic floor muscles.   5. PFM contraction before increase intra-abdominal pressure.  6. Pt will improve transversus abdominis recruitment to perform proper isometric contraction without requiring verbal or tactile cuing to promote advancement of therex  7. Pt will demonstrate good understanding of proper posture and body mechanics to decrease pain and improve spinal safety        Plan: continue with downtraining  Date: 11/11/24                TX#: 3/10 Date: 11/21/24                TX#: 4/10 Date: 12/2/24               TX#: 5/10 Date:   Tx#: 6/         Manual/NM Godwin:  DTM and manual stretch to layers 2 and 3 of PF Manual/NM Godwin:  DTM and manual stretch to layers 2 and 3 of PF Manual/NM Godwin:  DTM and manual stretch to layers 2 and 3 of PF                HEP:  toileting posture, happy baby, vipin pose, deep squat, double voiding    Charges: 2MAN 32 min 1NM 10 min       Total Timed Treatment: 42 min  Total Treatment Time: 45 min

## 2024-12-04 ENCOUNTER — ROUTINE PRENATAL (OUTPATIENT)
Dept: OBGYN CLINIC | Facility: CLINIC | Age: 29
End: 2024-12-04
Payer: COMMERCIAL

## 2024-12-04 VITALS
WEIGHT: 183.81 LBS | BODY MASS INDEX: 26 KG/M2 | HEART RATE: 79 BPM | SYSTOLIC BLOOD PRESSURE: 111 MMHG | DIASTOLIC BLOOD PRESSURE: 77 MMHG

## 2024-12-04 DIAGNOSIS — Z34.03 PRENATAL CARE, FIRST PREGNANCY IN THIRD TRIMESTER (HCC): Primary | ICD-10-CM

## 2024-12-04 NOTE — PATIENT INSTRUCTIONS
Adapting to Pregnancy: Third Trimester    Although common during pregnancy, some discomforts may seem worse in the final weeks. Simple lifestyle changes can help. Take care of yourself. And ask your partner to help out with small tasks.  Limiting leg problems  Ways to combat leg issues:  Wear support hose all day.  Avoid snug shoes and clothes that bind, like tight pants and socks with elastic tops.  Sit with your feet and legs raised often.  Caring for your breasts  Tips to follow include:  Wash with plain water. Avoid using harsh soaps or rubbing alcohol. They may cause dryness.  Wear a nursing bra for extra support. It can also hide any leaks from your nipples.  Controlling hemorrhoids  Ways to avoid hemorrhoids include:  Eat foods that are high in fiber. Also, exercise and drink enough fluids. This will reduce constipation and hemorrhoids.  Sleep and nap on your side. This limits pressure on the veins of your rectum.  Try not to stand or sit for long periods.  Controlling back pain  As your body changes during pregnancy, your back must work in new ways. Back pain is due to many causes. Physical changes in your body can strain your back and its supporting muscles. Also, hormones (chemicals that carry messages throughout the body) increase during pregnancy. This can affect how your muscles and joints work together. All of these changes can lead to pain. Pain may be felt in the upper or lower back. Pain is also common in the pelvis. Some pregnant women have sciatica. This is pain caused by pressure on the sciatic nerve running down the back of the leg. Ask your healthcare provider for specific tips and exercises to help control your back pain.  Tips to help you rest  Good rest and sleep will help you feel better. Here are some ideas:  Ask your partner to massage your shoulders, neck, or back.  Limit the errands you do each day.  Lie down in the afternoon or after work for a few minutes.  Take a warm bath before  you go to sleep.  Drink warm milk or teas without caffeine.  Avoid coffee, black tea, and cola.  Stopping heartburn  Avoid spicy, greasy, fried, or acidic foods.  Eat small amounts more often. Eat slowly. Wait 2 hours after eating before lying down.  Sleep with your upper body raised 6 inches.   Managing mood swings  Ways to manage mood swings include:  Know that mood changes are normal.  Exercise often, but get plenty of rest.  Address any concerns and limit stress. Talking to your partner, other women, or your healthcare provider may help.  Dealing with urinary frequency  Tips to deal with having to urinate often include:  Drink plenty of water all day. If you drink a lot in the evening, though, you may have to get up more in the night.  Limit coffee, black tea, and cola.  [employer] last reviewed this educational content on 2/1/2018 © 2000-2020 The [employer]. [address] [place] [address]. All rights reserved. This information is not intended as a substitute for professional medical care. Always follow your healthcare professional's instructions.        Pregnancy: Your Third Trimester Changes  As the baby grows, your body changes too. You may also see signs that your body is getting ready for labor. Be patient. Within a few more weeks, your baby will be born.  How you are changing  Your body is preparing for the birth of your baby. Some of the most common changes are listed below. If you have any questions or concerns, ask your healthcare provider:  You’ll gain more weight from fluids, extra blood, and fat deposits.  Your breasts will grow as your body gets ready to feed the baby. They may be more tender. You may also notice a slight yellow or white discharge from the nipples.  Discharge from your vagina may increase. This is normal.  You might see some skin color changes on your forehead, cheeks, or nose. Most of these will go away after you deliver.  How your baby is growing       Month  7  Your baby can open and close his or her eyes and weighs around 4 pounds. If born prematurely (too early), your baby would likely survive with special care. Month 8  Your baby is building up body fat and weighs around 6 pounds. Month 9  Your baby weighs nearly 7 pounds and is about 19 to 21 inches long. In other words, any day now...   [employer] last reviewed this educational content on 2018 [employer]. [address] [place] [address], [place] [address]. All rights reserved. This information is not intended as a substitute for professional medical care. Always follow your healthcare professional's instructions.   Understanding  Labor  Going into labor before your 37th week of pregnancy is called  labor.  labor can cause your baby to be born too soon. This can lead to a number of health problems that may affect your baby.      Before labor, the cervix is thick and closed.      In  labor, the cervix begins to efface (thin) and dilate (open).   Symptoms of  labor   If you think you’re having  labor, get medical help right away. Contractions alone don’t mean you’re in  labor. What matters more are changes in your cervix (the lower end of the uterus). Symptoms of  labor include:   Four or more contractions per hour  Strong contractions  Constant menstrual-like cramping  Low-back pain  Mucous or bloody vaginal discharge  Bleeding or spotting in the second or third trimester  Evaluating  labor   Your healthcare provider will try to find out whether you’re in  labor or whether you’re just having contractions. He or she may watch you for a few hours. The following tests may be done:   Pelvic exam to see if your cervix has effaced (thinned) and dilated (opened)  Uterine activity monitoring to detect contractions  Fetal monitoring to check the health of your baby  Ultrasound to check your baby’s size and position  Amniocentesis to  check how mature your baby’s lungs are  Caring for yourself at home   If you have  contractions, but your cervix is still thick and closed, your healthcare provider may ask you to do the following at home:   Drink plenty of water.  Do fewer activities.  Rest in bed on your side.  Don't have intercourse or nipple stimulation.  When to call your healthcare provider   Call your healthcare provider if you notice any of these:   Four or more contractions per hour  Bag of water breaks  Bleeding or spotting  If you need hospital care    labor often requires that you have hospital care and complete bed rest. You may have an IV (intravenous) line to get fluids. You may be given pills or injections to help prevent contractions. Finally, you may get medicine (corticosteroids) that helps your baby’s lungs mature more quickly.   Are you at risk?   Any pregnant woman can have  labor. It may start for no reason. But these risk factors can increase your chances:   Past  labor or past early birth  Smoking, drug, or alcohol use during pregnancy  Multiple fetuses (twins or more)  Problems with the shape of the uterus  Bleeding during the pregnancy  The dangers of  birth   A baby born too soon may have health problems. This is because the baby didn’t have enough time to mature. Some of the risks for your baby include:   Not breastfeeding or feeding well  Having immature lungs  Bleeding in the brain  Dying  Reaching term   Your goal is to get as close to term as you can before giving birth. The closer you get to term, the higher your chance of having a healthy baby. Work with your healthcare provider. Together, you can take steps that may keep you from giving birth too early.   [employer] last reviewed this educational content on 3/1/2019  © 8730-9493 The [employer], [employer]. [address] [place] [address], [place] [address]. All rights reserved. This information is not intended as a substitute for  professional medical care. Always follow your healthcare professional's instructions.        Premature Labor    Premature labor ( labor) is when symptoms of labor occur before 37 weeks of pregnancy. (This is 3 weeks before your due date.) Premature labor can lead to premature delivery. This means giving birth to your baby early. Babies need at least 37 weeks of pregnancy for all the organs to develop normally. The earlier the delivery, the greater the risks to the baby.  In most cases, the cause of premature labor is unknown. But certain factors may make the problem more likely. These include:  History of premature labor with other pregnancies  Smoking  Alcohol or substance abuse  Low pre-pregnancy weight or weight gain during pregnancy  Short time period between pregnancies  Being pregnant with twins, triplets, or more  History of certain types of surgery on the cervix or uterus  Having a short cervix  Certain infections  There are a number of other risk factors. Ask your healthcare provider to help you understand the risk factors specific to your case. Then find out what you can do to control or reduce them.  Contractions are one of the main signs of premature labor. A contraction is different from cramping. It may feel painful and the belly (abdomen) may get hard. It can last from a few seconds to a few minutes. Some women may feel only a sense of pressure in the belly, thighs, rectum, or vagina. Some may feel only the hardening of the uterus without pain or pressure. Or there may be a constant pain in the lower back, which spreads forward toward the belly.Premature labor is often treated with medicines. A hospital stay may be needed. If labor doesn't progress and you and your baby are both healthy, you may be discharged to continue care at home.  Home care  Ask your provider any questions you have. Be certain you understand how to care for yourself at home. Also follow all recommendations given by your  healthcare providers.  Learn the signs of premature labor. Watch for these signs when you get home.  Limit or restrict activities as advised. This may include stopping certain physical activities and cutting back hours at work.  Avoid doing strenuous work as directed by your provider. Ask family and friends for help with tasks and support at home, if needed.  Don’t smoke, drink alcohol, or use other harmful substances.  Take steps to reduce stress.  Report any unusual symptoms to your provider.    Follow-up care  Follow up with your healthcare provider, or as directed. Weekly visits with your provider may be needed.  When to seek medical advice  Call your healthcare provider right away if any of these occur:  Regular or frequent contractions, whether they are painful or not  Pressure in the pelvis  Pressure in the lower belly or mild cramping in your belly with or without diarrhea  Constant low, dull backache  Gush or slow leaking of water from your vagina  Change in vaginal discharge (watery, mucus, or bloody)  Any vaginal bleeding  Decreased movement of your baby  [employer] last reviewed this educational content on 6/1/2018 © 2000-2020 The [employer]. [address] [place], [place] [address]. All rights reserved. This information is not intended as a substitute for professional medical care. Always follow your healthcare professional's instructions.        Understanding Preeclampsia  Preeclampsia is high blood pressure (hypertension) that happens during pregnancy. It often shows up around the 20th week of pregnancy. It often goes back to normal by the 12th week after you give birth. It can lead to serious health risks for you and your baby. During your pregnancy, your healthcare provider will watch your blood pressure.    Symptoms  A common symptom of preeclampsia is high blood pressure. Other symptoms may include:  Rapid weight gain  Protein in your urine  Headache  Belly (abdominal) pain on your  right side  Vision problems. These include flashes or spots.  Swelling (edema) in your face or hands. This also often happens near the end of normal pregnancies, even without preeclampsia.  Tests you may have  Your healthcare provider will want to check your blood pressure throughout your pregnancy. If your blood pressure is high, you may have the following tests:  Urine tests to look for protein  Blood tests to confirm preeclampsia  Fetal monitoring to make sure that your baby is healthy  Treating preeclampsia  You may need to take a daily low dose of aspirin if you are at risk for preeclampsia. Preeclampsia almost always ends soon after you give birth. Until then, your healthcare provider can help manage your condition. If your symptoms are mild, you may need activity limits at home, including bed rest and no heavy lifting. If your symptoms are severe, you will stay in the hospital. Hospital treatment includes:  Activity limits to help control blood pressure. This means no heavy lifting and 8 hours per day lying down with the feet up.  Magnesium IV (intravenous) drip during labor to prevent seizures  Induced labor or surgical delivery by  section. Delivery is considered the cure for preeclampsia.  When to call your healthcare provider  Call your healthcare provider if swelling, weight gain, or other symptoms come on quickly or are severe. Some cases of preeclampsia are more severe than others. Your symptoms also may change or get worse as you get closer to your due date.  Who’s at risk?  No one knows what causes preeclampsia. Preeclampsia can happen in any pregnant woman. But it is more common in first-time pregnancies. Things that increase the risk include:  Previous pregnancies. You are at risk if you had preeclampsia, intrauterine growth retardation (IUGR),  birth, placental abruption, or fetal death in a past pregnancy.  Health history of mother. You are at risk if you have diabetes, high blood  pressure, obesity, kidney disease, autoimmune disease such as lupus, or a family history of preeclampsia.  Current pregnancy. You are at risk if this is your first pregnancy, or if you have multiple fetuses, are younger than age 18 or older than 40, or used in vitro fertilization.  Race. You are at risk if you are black.  Dangers of preeclampsia  If not treated, preeclampsia can cause problems for you and your baby. The placenta is the organ that nourishes your baby. It may tear away from the uterine wall. This can put the baby at risk for health problems (fetal distress) and premature birth. Preeclampsia can also cause these health problems:  Kidney failure or other organ damage  Seizures  Stroke  Once you give birth  In most cases, preeclampsia goes away on its own soon after you give birth. This is often by the 12th week after you give deliver. Within days of delivery, your blood pressure, swelling, and other symptoms should get better. For some women, problems from preeclampsia can continue after delivery.  Postpartum preeclampsia that develops within the first 48 hours after delivery is rare. Another type of postpartum preeclampsia that develops more than 48 hours after delivery is called late-onset preeclampsia. It is also rare. Contact your healthcare provider right away if you have symptoms of preeclampsia after you deliver.  [employer] last reviewed this educational content on 12/1/2019  © 8905-4840 The [employer], [employer]. [address] [place] [address], [place] [address]. All rights reserved. This information is not intended as a substitute for professional medical care. Always follow your healthcare professional's instructions.        Kick Counts    It’s normal to worry about your baby’s health. One way you can know your baby’s doing well is to record the baby’s movements once a day. This is called a kick count. Remember to take your kick count records to all your appointments with your healthcare provider.  How  to count kicks  Time how long it takes you to feel 10 kicks, flutters, swishes, or rolls. Ideally, you want to feel at least 10 movements within 2 hours. You will likely feel 10 movements in less time than that.  Starting at 28 weeks, count your baby's movements daily. Follow your healthcare provider's instructions for kick counting. Here are tips for counting kicks:  Choose a time when the baby is active, such as after a meal.   Sit comfortably or lie on your side.   The first time the baby moves, write down the time.   Count each movement until the baby has moved 10 times. This can take from 20 minutes to 2 hours.   If you have not felt 10 kicks by the end of the second hour, wait a few hours. Then try again.  Try to do it at the same time each day.  When to call your healthcare provider  Call your healthcare provider right away if:  You do a couple sets of kick counts during the day and your baby moves fewer than 10 times in 2 hours  Your baby moves much less often than on the days before.  You have not felt your baby move all day.  [employer] last reviewed this educational content on 12/1/2017  © 4086-4741 The [employer]. [address] [place] [address], [place] [address]. All rights reserved. This information is not intended as a substitute for professional medical care. Always follow your healthcare professional's instructions.

## 2024-12-04 NOTE — PROGRESS NOTES
Cleopatra, , is at 34w0d, here for her COMFORT visit.  Currently, she is feeling well. Denies 3rd trimester danger signs.   Had planned RSV vaccine today but non available in OP location.   Has questions about membrane sweeping.    Vital signs and weight reviewed  See flowsheets     Assessment/Plan: RSV ordered and pt to [facility] to receive, or can get at next appt  Next visit: 2 weeks. RSV then if not already administered    Reviewed:   Prenatal visit schedule   labor precautions  Kick counts  Danger signs  Sweeping of membranes procedure/timing/risks/benefits    Pt verbalized understanding. All questions answered. No barriers to learning identified

## 2024-12-05 ENCOUNTER — APPOINTMENT (OUTPATIENT)
Dept: PHYSICAL THERAPY | Facility: HOSPITAL | Age: 29
End: 2024-12-05
Attending: ADVANCED PRACTICE MIDWIFE
Payer: COMMERCIAL

## 2024-12-09 ENCOUNTER — APPOINTMENT (OUTPATIENT)
Dept: PHYSICAL THERAPY | Facility: HOSPITAL | Age: 29
End: 2024-12-09
Attending: ADVANCED PRACTICE MIDWIFE
Payer: COMMERCIAL

## 2024-12-09 ENCOUNTER — OFFICE VISIT (OUTPATIENT)
Dept: PHYSICAL THERAPY | Age: 29
End: 2024-12-09
Attending: FAMILY MEDICINE
Payer: COMMERCIAL

## 2024-12-09 PROCEDURE — 97112 NEUROMUSCULAR REEDUCATION: CPT

## 2024-12-09 PROCEDURE — 97140 MANUAL THERAPY 1/> REGIONS: CPT

## 2024-12-09 NOTE — PROGRESS NOTES
Dx: 28 weeks gestation of pregnancy (HCC) (Z3A.28) Pelvic pressure in pregnancy (HCC) (O26.899,R10.2)Stress incontinence (N39.3)           Insurance (Authorized # of Visits):  Cigna 10 per POC           Authorizing Physician: Dr. Marvin MEYER visit: none scheduled  Fall Risk: standard         Precautions: n/a           Subjective: More pressure. Harder to void. Feels more swollen.   Objective:  + swelling vulvar varicosities.     Assessment: Patient presents with increased pressure and soreness in vulva.  Tension in PF moderately increased.  Decreased tightness and improved mobility at end of session.     Goals:  (to be met in 10 visits)  1. Independent in HEP and progression with improved understanding of bladder health, bladder retraining and long-term management.    2. Patient will demonstrate improved bladder voiding habits to voiding every 2 hours with less hesitancy and more complete emptying   3. Patient will demonstrate improve PFM isolation, coordination and strength to so she is able to reduce urinary urge and prevent leakage during ADL's.  4. Pt will have increased transverse abdominis muscle strength to 2/5 to assist with supporting pelvic floor muscles.   5. PFM contraction before increase intra-abdominal pressure.  6. Pt will improve transversus abdominis recruitment to perform proper isometric contraction without requiring verbal or tactile cuing to promote advancement of therex  7. Pt will demonstrate good understanding of proper posture and body mechanics to decrease pain and improve spinal safety        Plan: continue with downtraining  Date: 11/21/24                TX#: 4/10 Date: 12/2/24               TX#: 5/10 Date: 12/9/24  Tx#: 6/10        Manual/NM Godwin:  DTM and manual stretch to layers 2 and 3 of PF Manual/NM Godwin:  DTM and manual stretch to layers 2 and 3 of PF Manual/NM Godwin:  DTM and manual stretch to layers 2 and 3 of PF             HEP: icing    Charges: 2MAN 32 min 1NM 11min       Total  Timed Treatment: 43 min  Total Treatment Time: 46 min

## 2024-12-19 ENCOUNTER — OFFICE VISIT (OUTPATIENT)
Dept: PHYSICAL THERAPY | Age: 29
End: 2024-12-19
Attending: FAMILY MEDICINE
Payer: COMMERCIAL

## 2024-12-19 ENCOUNTER — APPOINTMENT (OUTPATIENT)
Dept: PHYSICAL THERAPY | Facility: HOSPITAL | Age: 29
End: 2024-12-19
Attending: ADVANCED PRACTICE MIDWIFE
Payer: COMMERCIAL

## 2024-12-19 PROCEDURE — 97112 NEUROMUSCULAR REEDUCATION: CPT

## 2024-12-19 PROCEDURE — 97140 MANUAL THERAPY 1/> REGIONS: CPT

## 2024-12-19 NOTE — PROGRESS NOTES
Dx: 28 weeks gestation of pregnancy ([facility]) (Z3A.28) Pelvic pressure in pregnancy (HCC) (O26.899,R10.2)Stress incontinence (N39.3)           Insurance (Authorized # of Visits):  Cigna 10 per POC           Authorizing Physician: Dr. Marvin MEYER visit: none scheduled  Fall Risk: standard         Precautions: n/a           Subjective: Voiding better.    Objective:  improving tension in PF    Assessment: Patient presents with overall ease in voiding.  Tension in PF improving with manual release. Also, performed perineal stretch to posterior introitus to decrease risk of stretching.  She is progressing well towards goals.     Goals:  (to be met in 10 visits)  1. Independent in HEP and progression with improved understanding of bladder health, bladder retraining and long-term management.    2. Patient will demonstrate improved bladder voiding habits to voiding every 2 hours with less hesitancy and more complete emptying   3. Patient will demonstrate improve PFM isolation, coordination and strength to so she is able to reduce urinary urge and prevent leakage during ADL's.  4. Pt will have increased transverse abdominis muscle strength to 2/5 to assist with supporting pelvic floor muscles.   5. PFM contraction before increase intra-abdominal pressure.  6. Pt will improve transversus abdominis recruitment to perform proper isometric contraction without requiring verbal or tactile cuing to promote advancement of therex  7. Pt will demonstrate good understanding of proper posture and body mechanics to decrease pain and improve spinal safety        Plan: continue with downtraining  Date: 12/2/24               TX#: 5/10 Date: 12/9/24  Tx#: 6/10 Date 12/19/24  Tx 7/10        Manual/NM Godwin:  DTM and manual stretch to layers 2 and 3 of PF Manual/NM Godwin:  DTM and manual stretch to layers 2 and 3 of PF Manual/NM Godwin:  DTM and manual stretch to layers 2 and 3 of PF             HEP: perineal stretching    Charges: 2MAN 32 min 1NM 12min        Total Timed Treatment: 44 min  Total Treatment Time: 47min

## 2024-12-20 ENCOUNTER — ROUTINE PRENATAL (OUTPATIENT)
Dept: OBGYN CLINIC | Facility: CLINIC | Age: 29
End: 2024-12-20
Payer: COMMERCIAL

## 2024-12-20 VITALS
WEIGHT: 187 LBS | SYSTOLIC BLOOD PRESSURE: 112 MMHG | DIASTOLIC BLOOD PRESSURE: 77 MMHG | HEART RATE: 71 BPM | BODY MASS INDEX: 26 KG/M2

## 2024-12-20 DIAGNOSIS — Z34.03 PRENATAL CARE, FIRST PREGNANCY IN THIRD TRIMESTER (HCC): Primary | ICD-10-CM

## 2024-12-20 PROBLEM — Z34.90 PREGNANCY (HCC): Status: ACTIVE | Noted: 2024-12-20

## 2024-12-20 PROCEDURE — 90678 RSV VACC PREF BIVALENT IM: CPT | Performed by: ADVANCED PRACTICE MIDWIFE

## 2024-12-20 PROCEDURE — 90471 IMMUNIZATION ADMIN: CPT | Performed by: ADVANCED PRACTICE MIDWIFE

## 2024-12-20 NOTE — PATIENT INSTRUCTIONS
Understanding Preeclampsia  Preeclampsia is high blood pressure (hypertension) that happens during pregnancy. It often shows up around the 20th week of pregnancy. It often goes back to normal by the 12th week after you give birth. It can lead to serious health risks for you and your baby. During your pregnancy, your healthcare provider will watch your blood pressure.    Symptoms  A common symptom of preeclampsia is high blood pressure. Other symptoms may include:  Rapid weight gain  Protein in your urine  Headache  Belly (abdominal) pain on your right side  Vision problems. These include flashes or spots.  Swelling (edema) in your face or hands. This also often happens near the end of normal pregnancies, even without preeclampsia.  Tests you may have  Your healthcare provider will want to check your blood pressure throughout your pregnancy. If your blood pressure is high, you may have the following tests:  Urine tests to look for protein  Blood tests to confirm preeclampsia  Fetal monitoring to make sure that your baby is healthy  Treating preeclampsia  You may need to take a daily low dose of aspirin if you are at risk for preeclampsia. Preeclampsia almost always ends soon after you give birth. Until then, your healthcare provider can help manage your condition. If your symptoms are mild, you may need activity limits at home, including bed rest and no heavy lifting. If your symptoms are severe, you will stay in the hospital. Hospital treatment includes:  Activity limits to help control blood pressure. This means no heavy lifting and 8 hours per day lying down with the feet up.  Magnesium IV (intravenous) drip during labor to prevent seizures  Induced labor or surgical delivery by  section. Delivery is considered the cure for preeclampsia.  When to call your healthcare provider  Call your healthcare provider if swelling, weight gain, or other symptoms come on quickly or are severe. Some cases of  preeclampsia are more severe than others. Your symptoms also may change or get worse as you get closer to your due date.  Who’s at risk?  No one knows what causes preeclampsia. Preeclampsia can happen in any pregnant woman. But it is more common in first-time pregnancies. Things that increase the risk include:  Previous pregnancies. You are at risk if you had preeclampsia, intrauterine growth retardation (IUGR),  birth, placental abruption, or fetal death in a past pregnancy.  Health history of mother. You are at risk if you have diabetes, high blood pressure, obesity, kidney disease, autoimmune disease such as lupus, or a family history of preeclampsia.  Current pregnancy. You are at risk if this is your first pregnancy, or if you have multiple fetuses, are younger than age 18 or older than 40, or used in vitro fertilization.  Race. You are at risk if you are black.  Dangers of preeclampsia  If not treated, preeclampsia can cause problems for you and your baby. The placenta is the organ that nourishes your baby. It may tear away from the uterine wall. This can put the baby at risk for health problems (fetal distress) and premature birth. Preeclampsia can also cause these health problems:  Kidney failure or other organ damage  Seizures  Stroke  Once you give birth  In most cases, preeclampsia goes away on its own soon after you give birth. This is often by the 12th week after you give deliver. Within days of delivery, your blood pressure, swelling, and other symptoms should get better. For some women, problems from preeclampsia can continue after delivery.  Postpartum preeclampsia that develops within the first 48 hours after delivery is rare. Another type of postpartum preeclampsia that develops more than 48 hours after delivery is called late-onset preeclampsia. It is also rare. Contact your healthcare provider right away if you have symptoms of preeclampsia after you deliver.  Vj last reviewed this  educational content on 12/1/2019 © 2000-2020 [employer]. [address] [place] [address]. All rights reserved. This information is not intended as a substitute for professional medical care. Always follow your healthcare professional's instructions.        Kick Counts    It’s normal to worry about your baby’s health. One way you can know your baby’s doing well is to record the baby’s movements once a day. This is called a kick count. Remember to take your kick count records to all your appointments with your healthcare provider.  How to count kicks  Time how long it takes you to feel 10 kicks, flutters, swishes, or rolls. Ideally, you want to feel at least 10 movements within 2 hours. You will likely feel 10 movements in less time than that.  Starting at 28 weeks, count your baby's movements daily. Follow your healthcare provider's instructions for kick counting. Here are tips for counting kicks:  Choose a time when the baby is active, such as after a meal.   Sit comfortably or lie on your side.   The first time the baby moves, write down the time.   Count each movement until the baby has moved 10 times. This can take from 20 minutes to 2 hours.   If you have not felt 10 kicks by the end of the second hour, wait a few hours. Then try again.  Try to do it at the same time each day.  When to call your healthcare provider  Call your healthcare provider right away if:  You do a couple sets of kick counts during the day and your baby moves fewer than 10 times in 2 hours  Your baby moves much less often than on the days before.  You have not felt your baby move all day.  [employer] last reviewed this educational content on 12/1/2017 © 2000-2020 [employer]. [address] [place] [address]. All rights reserved. This information is not intended as a substitute for professional medical care. Always follow your healthcare professional's instructions.        Recognizing  Labor    The beginning of labor is the beginning of birth. You’ll start to feel strong contractions. That’s when the muscles of your uterus tighten up to help push your baby out during birth.  Yes, labor has probably started   Signs of labor include:  Your contractions are getting stronger and more painful instead of weaker. You’ll probably feel them throughout your whole uterus.  Your contractions are regular. This means that you feel them about every 5 to 10 minutes. And they are getting closer together.  You have pink-colored or blood-streaked fluid from your vagina.  You feel that the baby has \"dropped\" lower in your pelvis   Your water breaks. It may be a gush or a slow trickle of clear fluid from your vagina.  No, it’s probably not real labor   Signs of false labor include:  Your contractions aren’t regular or strong.  You feel the contractions only in your lower uterus.  Your contractions go away when you walk or change position.  Your contractions go away after drinking fluids.  When to call your healthcare provider  Call your healthcare provider or clinic right away if you notice any of these signs:  Fluid from your vagina, with or without contractions.  Bleeding heavy enough that you need a sanitary pad.  You don’t feel your baby moving as much as before.     NOTE: Contractions are timed by both of these measures:  The length of each contraction from its start to its finish.  How far apart the contractions are--the time between the start of one contraction and the start of the next contraction.   [employer] last reviewed this educational content on 10/1/2017  © 3645-5996 The [employer], [employer]. [address] [place] [address], [place] [address]. All rights reserved. This information is not intended as a substitute for professional medical care. Always follow your healthcare professional's instructions.        Stages of Labor    Labor has 3 stages. Your healthcare provider may talk about the progress of your labor  with certain words. One of these is your baby’s position. Another is your baby’s station. And the effacement and dilation of your cervix will be noted. Read below to learn about these terms and the 3 stages of labor.  Your baby moves into position  Position is your baby’s placement in your uterus. Your baby may be facing left or right. He or she may be head first or feet first. Station refers to how far your baby has moved down into your pelvic cavity.  First stage of labor  During the first stage of labor, contractions of the uterus help your cervix thin (efface). They also help it widen (dilate). This will help your baby pass through the birth canal (vagina). At first your contractions will not come that often or last that long. But as time passes, they will come more often, they may be more painful, and they will last longer. They will last about 30 to 60 seconds each. The first stage of labor lasts until the cervix is fully dilated.  Second stage of labor  When your cervix is fully dilated, the second stage of labor begins. In this stage, you will have stronger contractions of your uterus that will help your baby move down the birth canal. They may happen every 2 to 5 minutes. They may last from 45 to 90 seconds each. Your healthcare provider will ask you to push with each contraction. Try to rest between the contractions if you can. Your baby is delivered at the end of this stage of labor.  Third stage of labor  The third stage of labor comes after your baby is born. This is when the afterbirth (placenta) comes out of your uterus. Your uterus will continue to contract. But the contractions are much milder than before.  [employer]Well last reviewed this educational content on 10/1/2017  © 9554-8973 The [employer], [employer]. [address] [place] [address], [place] [address]. All rights reserved. This information is not intended as a substitute for professional medical care. Always follow your healthcare professional's  instructions.

## 2024-12-20 NOTE — PROGRESS NOTES
Cleopatra, , is at 36w2d, here for her COMFORT visit.  Currently, she is feeling well. Denies 3rd trimester danger signs. Request SVE today.  Desires RSV today.    Birth plan reviewed:    Support:   Pain management: un-medicated/hydrotherapy/ low intervention  Vitamin K: yes  Erythromycin ointment: yes  Placenta: discard  Circumcision: yes  Peds: [facility]  Feeding method: breast/has pump  Housing/car seat: stable/has  Contraception: considering    Pt offered for MA to be present during exam and patient declined.    Vital signs and weight reviewed  See flowsheets    Assessment/Plan: GBS sent and RSV given  Next visit: 1 week    Reviewed:   Prenatal visit schedule  Kick counts  Danger signs  Labor precautions      Pt verbalized understanding. All questions answered. No barriers to learning identified

## 2024-12-21 LAB — GROUP B STREP BY PCR FOR PCR OVT: NEGATIVE

## 2024-12-23 ENCOUNTER — TELEPHONE (OUTPATIENT)
Dept: OBGYN CLINIC | Facility: CLINIC | Age: 29
End: 2024-12-23

## 2024-12-23 ENCOUNTER — OFFICE VISIT (OUTPATIENT)
Dept: PHYSICAL THERAPY | Age: 29
End: 2024-12-23
Attending: FAMILY MEDICINE
Payer: COMMERCIAL

## 2024-12-23 PROCEDURE — 97112 NEUROMUSCULAR REEDUCATION: CPT

## 2024-12-23 PROCEDURE — 97140 MANUAL THERAPY 1/> REGIONS: CPT

## 2024-12-23 NOTE — PROGRESS NOTES
Dx: 28 weeks gestation of pregnancy ([facility]) (Z3A.28) Pelvic pressure in pregnancy ([facility]) (O26.899,R10.2)Stress incontinence (N39.3)           Insurance (Authorized # of Visits):  [employer] 10 per POC           Authorizing Physician: Dr. Marvin MEYER visit: none scheduled  Fall Risk: standard         Precautions: n/a           Subjective: Baby lower. More pressure. Double voiding more.    Objective:  improving tension in PF    Assessment: Patient presents with improving tension in PF. Able to double void and empty bladder. Performed perineal massage to decrease risk of tear. She is progressing well towards goals.     Goals:  (to be met in 10 visits)  1. Independent in HEP and progression with improved understanding of bladder health, bladder retraining and long-term management.    2. Patient will demonstrate improved bladder voiding habits to voiding every 2 hours with less hesitancy and more complete emptying   3. Patient will demonstrate improve PFM isolation, coordination and strength to so she is able to reduce urinary urge and prevent leakage during ADL's.  4. Pt will have increased transverse abdominis muscle strength to 2/5 to assist with supporting pelvic floor muscles.   5. PFM contraction before increase intra-abdominal pressure.  6. Pt will improve transversus abdominis recruitment to perform proper isometric contraction without requiring verbal or tactile cuing to promote advancement of therex  7. Pt will demonstrate good understanding of proper posture and body mechanics to decrease pain and improve spinal safety        Plan: continue with downtraining  Date: 12/9/24  Tx#: 6/10 Date 12/19/24  Tx 7/10 12/23/24  Tx 8/10        Manual/NM Godwin:  DTM and manual stretch to layers 2 and 3 of PF Manual/NM Godwin:  DTM and manual stretch to layers 2 and 3 of PF Manual/NM Godwin:  DTM and manual stretch to layers 2 and 3 of PF  Perineal massage/stretch             HEP: perineal stretching    Charges: 3MAN 45 min 1NM 10 min        Total Timed Treatment: 55 min  Total Treatment Time: 60 min

## 2024-12-23 NOTE — TELEPHONE ENCOUNTER
[employer] FMLA FROM [employer]OUSE WITH SIGNED TOBIAS, HOWEVER [facility] WAS NOT COLLECTED WILL SEND OVER TO FORMS FOR COMPLETION

## 2024-12-27 ENCOUNTER — TELEPHONE (OUTPATIENT)
Dept: OBGYN CLINIC | Facility: CLINIC | Age: 29
End: 2024-12-27

## 2024-12-27 ENCOUNTER — ROUTINE PRENATAL (OUTPATIENT)
Dept: OBGYN CLINIC | Facility: CLINIC | Age: 29
End: 2024-12-27

## 2024-12-27 VITALS
HEART RATE: 82 BPM | SYSTOLIC BLOOD PRESSURE: 115 MMHG | DIASTOLIC BLOOD PRESSURE: 76 MMHG | WEIGHT: 191.19 LBS | BODY MASS INDEX: 26.77 KG/M2 | HEIGHT: 71 IN

## 2024-12-27 DIAGNOSIS — Z34.03 PRENATAL CARE, FIRST PREGNANCY IN THIRD TRIMESTER (HCC): Primary | ICD-10-CM

## 2024-12-27 NOTE — TELEPHONE ENCOUNTER
Processing from encounter opened today for both patient and spouse Family Medical Leave Act/disability forms.

## 2024-12-27 NOTE — PATIENT INSTRUCTIONS
Understanding Preeclampsia  Preeclampsia is high blood pressure (hypertension) that happens during pregnancy. It often shows up around the 20th week of pregnancy. It often goes back to normal by the 12th week after you give birth. It can lead to serious health risks for you and your baby. During your pregnancy, your healthcare provider will watch your blood pressure.    Symptoms  A common symptom of preeclampsia is high blood pressure. Other symptoms may include:  Rapid weight gain  Protein in your urine  Headache  Belly (abdominal) pain on your right side  Vision problems. These include flashes or spots.  Swelling (edema) in your face or hands. This also often happens near the end of normal pregnancies, even without preeclampsia.  Tests you may have  Your healthcare provider will want to check your blood pressure throughout your pregnancy. If your blood pressure is high, you may have the following tests:  Urine tests to look for protein  Blood tests to confirm preeclampsia  Fetal monitoring to make sure that your baby is healthy  Treating preeclampsia  You may need to take a daily low dose of aspirin if you are at risk for preeclampsia. Preeclampsia almost always ends soon after you give birth. Until then, your healthcare provider can help manage your condition. If your symptoms are mild, you may need activity limits at home, including bed rest and no heavy lifting. If your symptoms are severe, you will stay in the hospital. Hospital treatment includes:  Activity limits to help control blood pressure. This means no heavy lifting and 8 hours per day lying down with the feet up.  Magnesium IV (intravenous) drip during labor to prevent seizures  Induced labor or surgical delivery by  section. Delivery is considered the cure for preeclampsia.  When to call your healthcare provider  Call your healthcare provider if swelling, weight gain, or other symptoms come on quickly or are severe. Some cases of  preeclampsia are more severe than others. Your symptoms also may change or get worse as you get closer to your due date.  Who’s at risk?  No one knows what causes preeclampsia. Preeclampsia can happen in any pregnant woman. But it is more common in first-time pregnancies. Things that increase the risk include:  Previous pregnancies. You are at risk if you had preeclampsia, intrauterine growth retardation (IUGR),  birth, placental abruption, or fetal death in a past pregnancy.  Health history of mother. You are at risk if you have diabetes, high blood pressure, obesity, kidney disease, autoimmune disease such as lupus, or a family history of preeclampsia.  Current pregnancy. You are at risk if this is your first pregnancy, or if you have multiple fetuses, are younger than age 18 or older than 40, or used in vitro fertilization.  Race. You are at risk if you are black.  Dangers of preeclampsia  If not treated, preeclampsia can cause problems for you and your baby. The placenta is the organ that nourishes your baby. It may tear away from the uterine wall. This can put the baby at risk for health problems (fetal distress) and premature birth. Preeclampsia can also cause these health problems:  Kidney failure or other organ damage  Seizures  Stroke  Once you give birth  In most cases, preeclampsia goes away on its own soon after you give birth. This is often by the 12th week after you give deliver. Within days of delivery, your blood pressure, swelling, and other symptoms should get better. For some women, problems from preeclampsia can continue after delivery.  Postpartum preeclampsia that develops within the first 48 hours after delivery is rare. Another type of postpartum preeclampsia that develops more than 48 hours after delivery is called late-onset preeclampsia. It is also rare. Contact your healthcare provider right away if you have symptoms of preeclampsia after you deliver.  Vj last reviewed this  educational content on 12/1/2019 © 2000-2020 [employer]. [address] [place] [address]. All rights reserved. This information is not intended as a substitute for professional medical care. Always follow your healthcare professional's instructions.        Kick Counts    It’s normal to worry about your baby’s health. One way you can know your baby’s doing well is to record the baby’s movements once a day. This is called a kick count. Remember to take your kick count records to all your appointments with your healthcare provider.  How to count kicks  Time how long it takes you to feel 10 kicks, flutters, swishes, or rolls. Ideally, you want to feel at least 10 movements within 2 hours. You will likely feel 10 movements in less time than that.  Starting at 28 weeks, count your baby's movements daily. Follow your healthcare provider's instructions for kick counting. Here are tips for counting kicks:  Choose a time when the baby is active, such as after a meal.   Sit comfortably or lie on your side.   The first time the baby moves, write down the time.   Count each movement until the baby has moved 10 times. This can take from 20 minutes to 2 hours.   If you have not felt 10 kicks by the end of the second hour, wait a few hours. Then try again.  Try to do it at the same time each day.  When to call your healthcare provider  Call your healthcare provider right away if:  You do a couple sets of kick counts during the day and your baby moves fewer than 10 times in 2 hours  Your baby moves much less often than on the days before.  You have not felt your baby move all day.  [employer] last reviewed this educational content on 12/1/2017 © 2000-2020 [employer]. [address] [place] [address]. All rights reserved. This information is not intended as a substitute for professional medical care. Always follow your healthcare professional's instructions.        Recognizing  Labor    The beginning of labor is the beginning of birth. You’ll start to feel strong contractions. That’s when the muscles of your uterus tighten up to help push your baby out during birth.  Yes, labor has probably started   Signs of labor include:  Your contractions are getting stronger and more painful instead of weaker. You’ll probably feel them throughout your whole uterus.  Your contractions are regular. This means that you feel them about every 5 to 10 minutes. And they are getting closer together.  You have pink-colored or blood-streaked fluid from your vagina.  You feel that the baby has \"dropped\" lower in your pelvis   Your water breaks. It may be a gush or a slow trickle of clear fluid from your vagina.  No, it’s probably not real labor   Signs of false labor include:  Your contractions aren’t regular or strong.  You feel the contractions only in your lower uterus.  Your contractions go away when you walk or change position.  Your contractions go away after drinking fluids.  When to call your healthcare provider  Call your healthcare provider or clinic right away if you notice any of these signs:  Fluid from your vagina, with or without contractions.  Bleeding heavy enough that you need a sanitary pad.  You don’t feel your baby moving as much as before.     NOTE: Contractions are timed by both of these measures:  The length of each contraction from its start to its finish.  How far apart the contractions are--the time between the start of one contraction and the start of the next contraction.   [employer] last reviewed this educational content on 10/1/2017  © 9633-8323 The [employer], [employer]. [address] [place] [address], [place] [address]. All rights reserved. This information is not intended as a substitute for professional medical care. Always follow your healthcare professional's instructions.        Stages of Labor    Labor has 3 stages. Your healthcare provider may talk about the progress of your labor  with certain words. One of these is your baby’s position. Another is your baby’s station. And the effacement and dilation of your cervix will be noted. Read below to learn about these terms and the 3 stages of labor.  Your baby moves into position  Position is your baby’s placement in your uterus. Your baby may be facing left or right. He or she may be head first or feet first. Station refers to how far your baby has moved down into your pelvic cavity.  First stage of labor  During the first stage of labor, contractions of the uterus help your cervix thin (efface). They also help it widen (dilate). This will help your baby pass through the birth canal (vagina). At first your contractions will not come that often or last that long. But as time passes, they will come more often, they may be more painful, and they will last longer. They will last about 30 to 60 seconds each. The first stage of labor lasts until the cervix is fully dilated.  Second stage of labor  When your cervix is fully dilated, the second stage of labor begins. In this stage, you will have stronger contractions of your uterus that will help your baby move down the birth canal. They may happen every 2 to 5 minutes. They may last from 45 to 90 seconds each. Your healthcare provider will ask you to push with each contraction. Try to rest between the contractions if you can. Your baby is delivered at the end of this stage of labor.  Third stage of labor  The third stage of labor comes after your baby is born. This is when the afterbirth (placenta) comes out of your uterus. Your uterus will continue to contract. But the contractions are much milder than before.  StayWell last reviewed this educational content on 10/1/2017  © 5207-8973 The [employer], [employer]. [address] [place] [address], [place] [address]. All rights reserved. This information is not intended as a substitute for professional medical care. Always follow your healthcare professional's  instructions.

## 2024-12-27 NOTE — PROGRESS NOTES
Cleopatra, , is at 37w2d, here for her COMFORT visit.  Currently, she is feeling well. Denies 3rd trimester danger signs. Requesting SVE today.    Pt offered for MA to be present during exam and patient declined.    Vital signs and weight reviewed  See flowsheets    Assessment/Plan: Care is up to date  Next visit: 1 week    Reviewed:   Prenatal visit schedule  Kick counts  Danger signs  Labor precautions    Pt verbalized understanding. All questions answered. No barriers to learning identified

## 2024-12-27 NOTE — TELEPHONE ENCOUNTER
[facility] Fmla docs through fax, no signed TOBIAS or pymnt was collected will send docs over to forms for completion

## 2024-12-27 NOTE — TELEPHONE ENCOUNTER
Patient called to confirm receipt of both her's and  Family Medical Leave Act for her Maternity Leave, found both, labeled 1/2 & 2/2 in logged box, gathered the below for both forms.    Type of Leave: Cont. Family Medical Leave Act/disability   Reason for Leave: Maternity   Start date of leave: on or around NATHAN 01/15/2024  End date of leave: 12 wks aprx  04/09/2025 w/ Potential Return to work 04/10/2025  How many flare ups per month/length?:  How many appts per month/length?:   Was Fee and Turnaround info Given?:    Type of Leave: Cont. Family Medical Leave Act   Reason for Leave:  Paternity Leave   Start date of leave: On or around NATHAN 01/15/2025  End date of leave: 5 wks aprx 02/19/2025 w/ potential Return to work 02/20/2025  How many flare ups per month/length?:  How many appts per month/length?:   Was Fee and Turnaround info Given?:    Closing other encounter to keep all under one.. and easier viewing... sending [employer] for Release of Information completion for [facility] for completion.

## 2024-12-28 NOTE — TELEPHONE ENCOUNTER
Idalia Saenz,     THERE ARE 2 FORMS NEED SIGNING, THANKS IN ADVANCE     Please sign off on form if you agree to: Family Medical Leave Act SP. & short term disability patient on or around NATHAN 01/15/2025-12 wks for patient and 5 wks for spouse   (place your signature on the first page only)    -From your Inbasket, Highlight the patient and click Chart   -Double click the 12/27/2024 Forms Completion telephone encounter  -Scroll down to the Media section   -Click the blue Hyperlink: Family Medical Leave Act Spouse/ Short Term Disability patient 12/28/2024  -Click Acknowledge located in the top right ribbon/menu   -Drag the mouse into the blank space of the document and a + sign will appear. Left click to   electronically sign the document.     Thank you,  Tori KAHN

## 2024-12-30 ENCOUNTER — OFFICE VISIT (OUTPATIENT)
Dept: PHYSICAL THERAPY | Age: 29
End: 2024-12-30
Attending: FAMILY MEDICINE
Payer: COMMERCIAL

## 2024-12-30 PROCEDURE — 97112 NEUROMUSCULAR REEDUCATION: CPT

## 2024-12-30 PROCEDURE — 97140 MANUAL THERAPY 1/> REGIONS: CPT

## 2024-12-30 NOTE — PROGRESS NOTES
Dx: 28 weeks gestation of pregnancy ([facility]) (Z3A.28) Pelvic pressure in pregnancy (HCC) (O26.899,R10.2)Stress incontinence (N39.3)           Insurance (Authorized # of Visits):  [employer] 10 per POC           Authorizing Physician: Dr. Marvin MEYER visit: none scheduled  Fall Risk: standard         Precautions: n/a           Subjective:  Voiding better but very frequent. Baby low.   Objective: TTP R coccygeus    Assessment:Patient presents with improving voiding.  Increased frequency due to  lower baby.  Very TTP in R layer 2. Palpation causes pain and spasm. Improved tension in PF after manual work.     Goals:  (to be met in 10 visits)  1. Independent in HEP and progression with improved understanding of bladder health, bladder retraining and long-term management.    2. Patient will demonstrate improved bladder voiding habits to voiding every 2 hours with less hesitancy and more complete emptying   3. Patient will demonstrate improve PFM isolation, coordination and strength to so she is able to reduce urinary urge and prevent leakage during ADL's.  4. Pt will have increased transverse abdominis muscle strength to 2/5 to assist with supporting pelvic floor muscles.   5. PFM contraction before increase intra-abdominal pressure.  6. Pt will improve transversus abdominis recruitment to perform proper isometric contraction without requiring verbal or tactile cuing to promote advancement of therex  7. Pt will demonstrate good understanding of proper posture and body mechanics to decrease pain and improve spinal safety        Plan: continue with downtraining  Date 12/19/24  Tx 7/10 12/23/24  Tx 8/10 Date 12/30/24  Tx 9/10        Manual/NM Godwin:  DTM and manual stretch to layers 2 and 3 of PF Manual/NM Godwin:  DTM and manual stretch to layers 2 and 3 of PF  Perineal massage/stretch Manual/NM Godwin:  DTM and manual stretch to layers 2 and 3 of PF  Perineal massage/stretch             HEP: perineal stretching    Charges: 3MAN 45 min 1NM  9 min       Total Timed Treatment: 54 min  Total Treatment Time: 55 min

## 2024-12-31 ENCOUNTER — ROUTINE PRENATAL (OUTPATIENT)
Dept: OBGYN CLINIC | Facility: CLINIC | Age: 29
End: 2024-12-31
Payer: COMMERCIAL

## 2024-12-31 VITALS
HEIGHT: 71 IN | DIASTOLIC BLOOD PRESSURE: 84 MMHG | SYSTOLIC BLOOD PRESSURE: 123 MMHG | WEIGHT: 191.38 LBS | BODY MASS INDEX: 26.79 KG/M2 | HEART RATE: 87 BPM

## 2024-12-31 DIAGNOSIS — Z36.89 NST (NON-STRESS TEST) REACTIVE (HCC): ICD-10-CM

## 2024-12-31 DIAGNOSIS — Z34.93 PRENATAL CARE, THIRD TRIMESTER (HCC): Primary | ICD-10-CM

## 2024-12-31 PROCEDURE — 59025 FETAL NON-STRESS TEST: CPT | Performed by: ADVANCED PRACTICE MIDWIFE

## 2024-12-31 RX ORDER — ZINC SULFATE 50(220)MG
220 CAPSULE ORAL DAILY
COMMUNITY

## 2024-12-31 NOTE — PROGRESS NOTES
Active fetus Increasing BH contractions. Denies any leaking of fluid or bleeding.  Reviewed S&S labor  Warning signs reviewed  All questions answered.     NST today in office low baseline

## 2024-12-31 NOTE — PROCEDURES
Nonstress Test       Patient: Cleopatra Chavez    Gestation: 37w6d    Diagnosis from order:  Low FHR baseline    Problem List:   Patient Active Problem List   Diagnosis    Family history of thyroid disorder    Glucose intolerance    Pregnancy (HCC)        NST:  NST completed.    Fetal Surveillance:   Fetal Heart Tones (FHTs): 120 with mod variability, accelerations >2 15x15, decelerations none  Uterine contractions (Ucs): occasional    [  X  ]  Reactive: Discharged home, follow-up plan PNC as planned      Sherry Estevez CNM, 12/31/24, 9:43 AM

## 2024-12-31 NOTE — TELEPHONE ENCOUNTER
Both Family Medical Leave Act/Spouse and Short Term Disability for patient have been signed and faxed out to [employer] & [facility], pending confirmation

## 2024-12-31 NOTE — TELEPHONE ENCOUNTER
Valid Release of Information for both patient and spouse ([facility]) signed on 12/28/24 received via [employer].

## 2025-01-02 ENCOUNTER — HOSPITAL ENCOUNTER (INPATIENT)
Facility: HOSPITAL | Age: 30
LOS: 2 days | Discharge: HOME | End: 2025-01-04
Attending: ADVANCED PRACTICE MIDWIFE | Admitting: OBSTETRICS & GYNECOLOGY
Payer: COMMERCIAL

## 2025-01-02 PROBLEM — O42.90 AMNIOTIC FLUID LEAKING (HCC): Status: ACTIVE | Noted: 2025-01-02

## 2025-01-02 LAB
ANTIBODY SCREEN: NEGATIVE
BASOPHILS # BLD AUTO: 0.02 X10(3) UL (ref 0–0.2)
BASOPHILS NFR BLD AUTO: 0.2 %
DEPRECATED RDW RBC AUTO: 42.5 FL (ref 35.1–46.3)
EOSINOPHIL # BLD AUTO: 0.01 X10(3) UL (ref 0–0.7)
EOSINOPHIL NFR BLD AUTO: 0.1 %
ERYTHROCYTE [DISTWIDTH] IN BLOOD BY AUTOMATED COUNT: 13.2 % (ref 11–15)
HCT VFR BLD AUTO: 40.3 %
HGB BLD-MCNC: 13.5 G/DL
HIV 1+2 AB+HIV1 P24 AG SERPL QL IA: NONREACTIVE
IMM GRANULOCYTES # BLD AUTO: 0.05 X10(3) UL (ref 0–1)
IMM GRANULOCYTES NFR BLD: 0.5 %
LYMPHOCYTES # BLD AUTO: 2.15 X10(3) UL (ref 1–4)
LYMPHOCYTES NFR BLD AUTO: 22.6 %
MCH RBC QN AUTO: 29.7 PG (ref 26–34)
MCHC RBC AUTO-ENTMCNC: 33.5 G/DL (ref 31–37)
MCV RBC AUTO: 88.6 FL
MONOCYTES # BLD AUTO: 0.57 X10(3) UL (ref 0.1–1)
MONOCYTES NFR BLD AUTO: 6 %
NEUTROPHILS # BLD AUTO: 6.7 X10 (3) UL (ref 1.5–7.7)
NEUTROPHILS # BLD AUTO: 6.7 X10(3) UL (ref 1.5–7.7)
NEUTROPHILS NFR BLD AUTO: 70.6 %
PLATELET # BLD AUTO: 238 10(3)UL (ref 150–450)
RBC # BLD AUTO: 4.55 X10(6)UL
RH BLOOD TYPE: POSITIVE
T PALLIDUM AB SER QL IA: NONREACTIVE
WBC # BLD AUTO: 9.5 X10(3) UL (ref 4–11)

## 2025-01-02 RX ORDER — CITRIC ACID/SODIUM CITRATE 334-500MG
30 SOLUTION, ORAL ORAL AS NEEDED
Status: DISCONTINUED | OUTPATIENT
Start: 2025-01-02 | End: 2025-01-03 | Stop reason: HOSPADM

## 2025-01-02 RX ORDER — ACETAMINOPHEN 500 MG
1000 TABLET ORAL EVERY 6 HOURS PRN
Status: DISCONTINUED | OUTPATIENT
Start: 2025-01-02 | End: 2025-01-03 | Stop reason: HOSPADM

## 2025-01-02 RX ORDER — CALCIUM CARBONATE 500 MG/1
1000 TABLET, CHEWABLE ORAL EVERY 4 HOURS PRN
Status: DISCONTINUED | OUTPATIENT
Start: 2025-01-02 | End: 2025-01-03 | Stop reason: HOSPADM

## 2025-01-02 RX ORDER — LIDOCAINE HYDROCHLORIDE 10 MG/ML
30 INJECTION, SOLUTION EPIDURAL; INFILTRATION; INTRACAUDAL; PERINEURAL ONCE
Status: COMPLETED | OUTPATIENT
Start: 2025-01-02 | End: 2025-01-03

## 2025-01-02 RX ORDER — IBUPROFEN 600 MG/1
600 TABLET, FILM COATED ORAL ONCE AS NEEDED
Status: COMPLETED | OUTPATIENT
Start: 2025-01-02 | End: 2025-01-03

## 2025-01-02 RX ORDER — SODIUM CHLORIDE, SODIUM LACTATE, POTASSIUM CHLORIDE, CALCIUM CHLORIDE 600; 310; 30; 20 MG/100ML; MG/100ML; MG/100ML; MG/100ML
INJECTION, SOLUTION INTRAVENOUS AS NEEDED
Status: DISCONTINUED | OUTPATIENT
Start: 2025-01-02 | End: 2025-01-03 | Stop reason: HOSPADM

## 2025-01-02 RX ORDER — TERBUTALINE SULFATE 1 MG/ML
0.25 INJECTION, SOLUTION SUBCUTANEOUS AS NEEDED
Status: DISCONTINUED | OUTPATIENT
Start: 2025-01-02 | End: 2025-01-03 | Stop reason: HOSPADM

## 2025-01-02 RX ORDER — ONDANSETRON 2 MG/ML
4 INJECTION INTRAMUSCULAR; INTRAVENOUS EVERY 6 HOURS PRN
Status: DISCONTINUED | OUTPATIENT
Start: 2025-01-02 | End: 2025-01-03 | Stop reason: HOSPADM

## 2025-01-02 RX ORDER — DEXTROSE, SODIUM CHLORIDE, SODIUM LACTATE, POTASSIUM CHLORIDE, AND CALCIUM CHLORIDE 5; .6; .31; .03; .02 G/100ML; G/100ML; G/100ML; G/100ML; G/100ML
INJECTION, SOLUTION INTRAVENOUS CONTINUOUS
Status: DISCONTINUED | OUTPATIENT
Start: 2025-01-02 | End: 2025-01-03 | Stop reason: HOSPADM

## 2025-01-02 RX ORDER — ACETAMINOPHEN 500 MG
500 TABLET ORAL EVERY 6 HOURS PRN
Status: DISCONTINUED | OUTPATIENT
Start: 2025-01-02 | End: 2025-01-03 | Stop reason: HOSPADM

## 2025-01-02 NOTE — TELEPHONE ENCOUNTER
Family Medical Leave Act & Short Term Disability forms signed by provider, faxed to [employer] @ [phone] & [employer] @ [phone] successfully 12/28/2024 sending via [employer] to adv patient. Archiving forms.

## 2025-01-02 NOTE — H&P
[facility]  part of [facility]    History & Physical    Cleopatra Chavez Patient Status:  Inpatient    1995 MRN [id number]   Location [facility] Attending Idalia Saenz CNM   Hosp Day # 0 Admitting Chester Torrez MD     Date of Admission:  2025      HPI:   Cleopatra Chavez is 29 year old and  with current gestational age of 38w1d and estimated due date of 1/15/2025, by 7wga Ultrasound .    Cleopatra is being admitted for labor management.    Her current obstetrical history is  uncomplicated .    Patient reports good fetal movement, sandra irregularly since last evening, leaking of clear fluid since 1400, and no bleeding .     Fetal Movement: normal.     History   Obstetric History:   OB History    Para Term  AB Living   1 0 0 0 0 0   SAB IAB Ectopic Multiple Live Births   0 0 0 0 0      # Outcome Date GA Lbr Scott/2nd Weight Sex Type Anes PTL Lv   1 Current              Past Medical History: No past medical history on file.  Past Social History:   Past Surgical History:   Procedure Laterality Date    Other surgical history      Bunion Surgery    [place] teeth removed Bilateral      Family History:   Family History   Problem Relation Age of Onset    Thyroid disease Mother     Cancer Maternal Grandmother         Breast cancer    Diabetes Maternal Grandmother     Heart Disease Maternal Grandfather     Cancer Paternal Grandmother         Breast cancer and lung cancer    Heart Disease Paternal Grandfather     Cancer Paternal Grandfather         Lung Cancer    Heart Attack Paternal Grandfather      Social History:   Social History     Tobacco Use    Smoking status: Never     Passive exposure: Never    Smokeless tobacco: Never   Substance Use Topics    Alcohol use: Yes     Alcohol/week: 4.0 standard drinks of alcohol     Types: 1 Glasses of wine, 3 Standard drinks or equivalent per week        Allergies/Medications:   Allergies:    Allergies[1]  Medications:  Prescriptions Prior to Admission[2]    Review of Systems:   Constitutional: denies fever, aches, chills  HEENT: denies visual changes  Skin: denies any unusual skin lesions or itching  Lungs: denies shortness of breath  Cardiovascular: denies chest pain  GI: denies heartburn, denies constipation or diarrhea  : denies dysuria, pelvic pain, vaginal discharge or bleeding  Musculoskeletal: denies back or joint pain  Neuro denies headaches or dizziness  Psych: denies depression or anxiety    Physical Exam:        Constitutional: alert, appears stated age, and cooperative  Skin: no lesions or erythema  Respiratory: clear, unlabored  Cardiac: regular rate and rhythm   Abdomen: soft, non-tender, EFW 3400g, presentation cephalic, uterine contractions q  Fetal Surveillance:  120 BPM; Fetal heart variability: moderate  Fetal Heart Rate decelerations: none  Fetal Heart Rate accelerations: yes  Uterine contractions: regular, every 2-5 minutes  Pelvis: Gynecoid  External Genitalia:  No lesions  Sterile vaginal exam: Dilation: 4-5  Effacement: 80  Station: -1  Position: Cephalic  Extremities: extremities normal, atraumatic, no cyanosis or edema  Musculoskeletal: steady gait  Psych:  Appropriate affect and speech    Results:     Prenatal Results       Initial       Test Value Reference Range Date Time    Blood Type (ABO Group)  O   06/13/24 0811    Rh Factor  Positive   06/13/24 0811    Antibody Screen (Required questions in OE to answer)  Negative   06/13/24 0811    HCT  39.9 % 35.0 - 48.0 07/17/24 1336       36.4 % 35.0 - 48.0 06/13/24 0811    HGB  13.5 g/dL 12.0 - 16.0 07/17/24 1336       13.0 g/dL 12.0 - 16.0 06/13/24 0811    MCV  90.1 fL 80.0 - 100.0 07/17/24 1336       89.7 fL 80.0 - 100.0 06/13/24 0811    Platelets  324.0 10(3)uL 150.0 - 450.0 07/17/24 1336       324.0 10(3)uL 150.0 - 450.0 06/13/24 0811    Rubella  Positive  Positive 06/13/24 0811    TREP  Nonreactive  Nonreactive  06/13/24  0811    RPR ([facility])        Urine Culture  No Growth at 18-24 hrs.   24 0811    Hepatitis B  Nonreactive  Nonreactive  24 0811    HIV Combo  Non-Reactive  Non-Reactive 10/18/24 0837       Non-Reactive  Non-Reactive 24 0811    HCV  Nonreactive  Nonreactive  24 0811              Optional Initial Labs       Test Value Reference Range Date Time    TSH  0.768 mIU/mL 0.550 - 4.780 24 1336       0.623 mIU/mL 0.550 - 4.780 24 1357    Pap Smear  Negative for intraepithelial lesion or malignancy  Negative for intraepithelial lesion or malignancy 23 1152    HPV        GC DNA        Chlamydia DNA        GTT 1 Hr        Glucose Fasting        Glucose 1 Hr        Glucose 2 Hr        Glucose 3 Hr        HgB A1c  5.2 % <5.7 24 0811    Vitamin D                  8-20 Weeks       Test Value Reference Range Date Time    1st Trimester Aneuploidy Risk Assessment        Quad - Down Screen Risk Estimate - prior to 18        Quad - Down Maternal Age Risk - prior to 18        Quad - Trisomy 18 screen Risk Estimate - prior to 18        AFP Spina Bifida (Required questions in OE to answer )        QUAD/AFP - Interpretation        Free Fetal DNA         Genetic testing        Genetic testing        Genetic testing        GC DNA        Chlamydia DNA                  24-28 Weeks       Test Value Reference Range Date Time    HCT  35.6 % 35.0 - 48.0 10/18/24 0837    HGB  12.4 g/dL 12.0 - 16.0 10/18/24 0837    Platelets  235.0 10(3)uL 150.0 - 450.0 10/18/24 0837    GTT 1 Hr  149 mg/dL See comment 10/18/24 0837    Glucose Fasting  77 mg/dL See comment 10/24/24 0824    Glucose 1 Hr  117 mg/dL See comment 10/24/24 0928    Glucose 2 Hr  99 mg/dL See comment 10/24/24 1028    Glucose 3 Hr  39 mg/dL 70 - <140 10/24/24 1128    TSH         Profile        Urine Culture        GC DNA        Chlamydia DNA                  35-37 Weeks       Test Value Reference Range Date Time    HCT         HGB        Platelets        TREP  Nonreactive  Nonreactive  10/18/24 0837    RPR ([facility])        Genital Group B Culture  Negative  Negative 24 0852    TSH        Urine Culture  No Growth at 18-24 hrs.   24 1114    HIV Combo        GC DNA        Chlamydia DNA        Rapid HIV                  Optional Labs       Test Value Reference Range Date Time    HgB A1c  4.9 % <5.7 10/24/24 1028    HGB Electrophoresis  (See Report)    24 0811    Varicella Zoster  426.10  >165.00 24 0811    Cystic Fibrosis-Old         Cystic Fibrosis[32] (Required questions in OE to answer)        Cystic Fibrosis[165] (Required questions in OE to answer)        Cystic Fibrosis[165] (Required questions in OE to answer)        Cystic Fibrosis[165] (Required questions in OE to answer)        Sickle Cell        24Hr Urine Protein        24Hr Urine Creatinine        Parvo B19 IgM        Parvo B19 IgG                  Legend    ^: Historical                            Reviewed all prenatal ultrasounds    Admit labs pending    Assessment/Plan:   Cleopatra is 29 year old and  with current gestational age of 38w1d and estimated due date of 1/15/2025, by Ultrasound.    SROM and Early latent labor.  Category 1 FHR tracing  Obstetrical history is uncomplicated.    Admission problem(s):    Amniotic fluid leaking (HCC)  Expectant management at this time, will consider pitocin augmentation if indicated  Intermittent monitoring  Utilize hydrotherapy as desires    Risks, benefits, alternatives and possible complications have been discussed in detail with the patient.   Pre-admission, admission, and post admission procedures and expectations were discussed in detail.    All questions answered, all appropriate consents will be signed at the Hospital. Admission is planned for today.   Expectant management. and Anticipate vaginal delivery..    ELLIE Carson in direct collaboration with and under direct supervision of Idalia Saenz  ELPIDIOM    Patient seen in conjunction with [facility]. I personally witnessed the patient's exam and medical decision making on this date of service.  I was physically present in the room for the performance of the E/M service.  I have reviewed the CNM student's documentation and findings including history, Exam, and Medical Decision Making, edited the document for accuracy and verify that it represents the clinical findings and services performed.      Idalia Saenz CNM  1/2/2025  5:07 PM  Caring for patient until 1900       [1] No Known Allergies  [2]   Medications Prior to Admission   Medication Sig Dispense Refill Last Dose/Taking    zinc sulfate 220 (50 Zn) MG Oral Cap Take 1 capsule (220 mg total) by mouth daily.       Bacillus Coagulans-Inulin (PROBIOTIC) 1-250 BILLION-MG Oral Cap        Calcium Citrate 250 MG Oral Tab        cholecalciferol (D3 5000) 125 MCG (5000 UT) Oral Cap        Magnesium 300 MG Oral Cap        Omega-3 Fatty Acids (OMEGA-3 EPA FISH OIL OR)        prenatal vitamin with DHA 27-0.8-228 MG Oral Cap Take 1 capsule by mouth daily.

## 2025-01-02 NOTE — PROGRESS NOTES
Pt is a 29 year old female admitted to R8/R8-A.     Chief Complaint   Patient presents with    R/o Rom     Leaking clear fluid since 1400 today, +FM      Pt is  38w1d intra-uterine pregnancy.  History obtained, consents signed. Oriented to room, staff, and plan of care.

## 2025-01-02 NOTE — PROGRESS NOTES
Pt is a 29 year old female admitted to TR3/TR3-A.     Chief Complaint   Patient presents with    R/o Rom     Leaking clear fluid since 1400 today, +FM      Pt is  38w1d intra-uterine pregnancy.  History obtained, consents signed. Oriented to room, staff, and plan of care.

## 2025-01-03 PROBLEM — E74.39 GLUCOSE INTOLERANCE: Status: RESOLVED | Noted: 2024-10-18 | Resolved: 2025-01-03

## 2025-01-03 PROBLEM — Z37.9 NORMAL LABOR (HCC): Status: RESOLVED | Noted: 2025-01-03 | Resolved: 2025-01-03

## 2025-01-03 PROBLEM — O42.90 AMNIOTIC FLUID LEAKING (HCC): Status: RESOLVED | Noted: 2025-01-02 | Resolved: 2025-01-03

## 2025-01-03 PROBLEM — Z37.9 NORMAL LABOR (HCC): Status: ACTIVE | Noted: 2025-01-03

## 2025-01-03 PROCEDURE — 0KQM0ZZ REPAIR PERINEUM MUSCLE, OPEN APPROACH: ICD-10-PCS | Performed by: ADVANCED PRACTICE MIDWIFE

## 2025-01-03 PROCEDURE — 59400 OBSTETRICAL CARE: CPT | Performed by: ADVANCED PRACTICE MIDWIFE

## 2025-01-03 RX ORDER — SIMETHICONE 80 MG
80 TABLET,CHEWABLE ORAL 3 TIMES DAILY PRN
Status: DISCONTINUED | OUTPATIENT
Start: 2025-01-03 | End: 2025-01-04

## 2025-01-03 RX ORDER — HYDROCODONE BITARTRATE AND ACETAMINOPHEN 5; 325 MG/1; MG/1
1 TABLET ORAL EVERY 6 HOURS PRN
Status: COMPLETED | OUTPATIENT
Start: 2025-01-03 | End: 2025-01-04

## 2025-01-03 RX ORDER — DOCUSATE SODIUM 100 MG/1
100 CAPSULE, LIQUID FILLED ORAL
Status: DISCONTINUED | OUTPATIENT
Start: 2025-01-03 | End: 2025-01-04

## 2025-01-03 RX ORDER — IBUPROFEN 600 MG/1
600 TABLET, FILM COATED ORAL EVERY 6 HOURS
Status: DISCONTINUED | OUTPATIENT
Start: 2025-01-03 | End: 2025-01-04

## 2025-01-03 RX ORDER — AMMONIA 15 % (W/V)
0.3 AMPUL (EA) INHALATION AS NEEDED
Status: DISCONTINUED | OUTPATIENT
Start: 2025-01-03 | End: 2025-01-04

## 2025-01-03 RX ORDER — ACETAMINOPHEN 500 MG
1000 TABLET ORAL EVERY 6 HOURS PRN
Status: DISCONTINUED | OUTPATIENT
Start: 2025-01-03 | End: 2025-01-04

## 2025-01-03 RX ORDER — ACETAMINOPHEN 500 MG
500 TABLET ORAL EVERY 6 HOURS PRN
Status: DISCONTINUED | OUTPATIENT
Start: 2025-01-03 | End: 2025-01-04

## 2025-01-03 RX ORDER — CHOLECALCIFEROL (VITAMIN D3) 25 MCG
1 TABLET,CHEWABLE ORAL DAILY
Status: DISCONTINUED | OUTPATIENT
Start: 2025-01-03 | End: 2025-01-04

## 2025-01-03 RX ORDER — HYDROCODONE BITARTRATE AND ACETAMINOPHEN 5; 325 MG/1; MG/1
1 TABLET ORAL EVERY 6 HOURS PRN
Status: DISCONTINUED | OUTPATIENT
Start: 2025-01-03 | End: 2025-01-03

## 2025-01-03 RX ORDER — BISACODYL 10 MG
10 SUPPOSITORY, RECTAL RECTAL ONCE AS NEEDED
Status: DISCONTINUED | OUTPATIENT
Start: 2025-01-03 | End: 2025-01-04

## 2025-01-03 NOTE — L&D DELIVERY NOTE
Mason Chavez [[id number]]      Labor Events     labor?: No   steroids?: None  Antibiotics received during labor?: No  Rupture date/time: 2025 1400     Rupture type: SROM  Fluid color: Clear  Labor type: Spontaneous Onset of Labor  Augmentation: Oxytocin  Intrapartum & labor complications: None       Labor Event Times    Labor onset date/time: 2025 1400  Dilation complete date/time: 1/3/2025 0141  Start pushing date/time: 1/3/2025 0152        Presentation    Presentation: Vertex  Position: Occiput Anterior       Operative Delivery    Operative Vaginal Delivery: No                Shoulder Dystocia    Shoulder Dystocia: No       Anesthesia    Method: Local   Analgesics:  Analgesics   NITROUS OXIDE (KERATOLYTIC)              Delivery      Head delivery date/time: 1/3/2025 03:43:30   Delivery date/time:  1/3/25 03:43:51   Delivery type: Normal spontaneous vaginal delivery    Details:     Delivery location: delivery room  Delivery Room Temperature: 72       Delivery Providers    Delivering Clinician: Sherry Estevez CNM   Delivery personnel:  Provider Role   Ana Packer, ANDRÉS Baby Nurse   Adalgisa Roque RN Delivery Nurse   Kita Gallego Surgical Tech             Cord    Vessels: 3 Vessels  Complications: Nuchal  # of loops: 1  Timed cord clamping: Yes  Time in sec: 60  Cord blood disposition: to lab  Gases sent?: No       Resuscitation    Method: None        Measurements      Weight: 3700 g 8 lb 2.5 oz                Placenta    Date/time: 1/3/2025 0356  Removal: Spontaneous  Appearance: Intact  Disposition: Discarded       Apgars    Living status: Living   Apgar Scoring Key:    0 1 2    Skin color Blue or pale Acrocyanotic Completely pink    Heart rate Absent <100 bpm >100 bpm    Reflex irritability No response Grimace Cry or active withdrawal    Muscle tone Limp Some flexion Active motion    Respiratory effort Absent Weak cry; hypoventilation Good, crying               1 Minute:  5 Minute:  10 Minute:  15 Minute:  20 Minute:      Skin color: 1  1       Heart rate: 2  2       Reflex irritablity: 2  2       Muscle tone: 2  2       Respiratory effort: 2  2       Total: 9  9          Apgars assigned by: ANDRÉS MITCHELL       Skin to Skin    Skin to skin initiated date/time: 1/3/2025 0343  Skin to skin with: Mother       Vaginal Count    Initial count RN: Adalgisa Roque RN  Initial count Tech: Lashonda, Kita   Sponges   Sharps    Initial counts 10   0    Final counts 20   4           Lacerations    Episiotomy: None  Perineal lacerations: 2nd Repaired?: Yes     Vaginal laceration?: No      Cervical laceration?: No    Clitoral laceration?: No    Quantitative blood loss (mL): 561              [facility]  part of [facility]    Vaginal Delivery Note    Cleopatra Chavez Patient Status:  Inpatient    1995 MRN [id number]   Location [facility] Attending Sherry Estevez CNM   Hosp Day # 1 PCP Wayne Merida MD     Delivery     Infant  Date of Delivery: 1/3/2025   Time of Delivery: 3:43 AM  Delivery Type: Normal spontaneous vaginal delivery    Infant Sex/Birthweight: male 8 lb 2.5 oz (3.7 kg)    Presentation Vertex [1]  Position   Occiput [1] Anterior [1]    Apgars:  1 minute: 9               5 minutes: 9                        10 minutes:      Placenta  Date/Time of Delivery: 1/3/2025  3:56 AM   Delivery: spontaneous  Placenta to Pathology: no  Cord Gases Submitted: no  Cord Blood Collection: yes  Cord Tissue Collection: yes  Cord Complications: single nuchal  Sponge and Needle Counts:  Verified yes    Maternal Anesthesia: local   Episiotomy/Laceration Repair  Laceration: vaginal 2nd degree and bilateral labial abrasions  Suture Size/Type: 3-0 Chromic  Anesthesia: 1% lidocaine    Delivery Complications  none    Neonatologist Present: no  Delivery Comment:   Cleopatra progressed to complete dilatation and 0 station prior to pushing  successfully to viable baby boy. See Delivery Summary above for time, APGARs, and weight. Fetal head presented in the JAGDEEP position. Sweep for nuchal cord was performed and single nuchal cord identified and easily reduced. Anterior shoulder delivered spontaneously with slight traction. Baby vigorous at birth. To maternal abdomen for dry and stimulation then cord cut after pulsing ceased. Prophylactic IV pitocin initiated in 3rd stage. Spontaneous placenta by Schutlz mechanism, complete with 3 vessel cord. Hemostasis achieved by fundal massage and prophylactic IV Pitocin. Vagina and perineum inspected and 2nd degree vaginal and bilateral labial abrasions noted and repaired. . Mother and infant appeared in stable condition when midwife left the delivery room. Sharps and 4x4 counts were correct. Breastfeeding initiated.     Intake/Output     QBL: 561     ELLIE Carson in collaboration with and under direct supervision of Sherry Estevez CNM    Patient cared for in conjunction with ELLIE. I personally witnessed the patient's exam and medical decision making on this date of service.  I was physically present in the room, gowned and gloved and providing care and support during the performance of the E/M service.  I have reviewed the LG student's documentation and findings including history, Exam, and Medical Decision Making, edited the document for accuracy and verify that it represents the clinical findings and services performed      Sherry Estevez CNM   1/3/2025  4:56 AM

## 2025-01-03 NOTE — PLAN OF CARE
Problem: Patient Centered Care  Goal: Patient preferences are identified and integrated in the patient's plan of care  Description: Interventions:  - What would you like us to know as we care for you?   - Provide timely, complete, and accurate information to patient/family  - Incorporate patient and family knowledge, values, beliefs, and cultural backgrounds into the planning and delivery of care  - Encourage patient/family to participate in care and decision-making at the level they choose  - Honor patient and family perspectives and choices  Outcome: Progressing     Problem: POSTPARTUM  Goal: Long Term Goal:Experiences normal postpartum course  Description: INTERVENTIONS:  - Assess and monitor vital signs and lab values.  - Assess fundus and lochia.  - Provide ice/sitz baths for perineum discomfort.  - Monitor healing of incision/episiotomy/laceration, and assess for signs and symptoms of infection and hematoma.  - Assess bladder function and monitor for bladder distention.  - Provide/instruct/assist with pericare as needed.  - Provide VTE prophylaxis as needed.  - Monitor bowel function.  - Encourage ambulation and provide assistance as needed.  - Assess and monitor emotional status and provide social service/psych resources as needed.  - Utilize standard precautions and use personal protective equipment as indicated. Ensure aseptic care of all intravenous lines and invasive tubes/drains.  - Obtain immunization and exposure to communicable diseases history.  Outcome: Progressing  Goal: Optimize infant feeding at the breast  Description: INTERVENTIONS:  - Initiate breast feeding within first hour after birth.   - Monitor effectiveness of current breast feeding efforts.  - Assess support systems available to mother/family.  - Identify cultural beliefs/practices regarding lactation, letdown techniques, maternal food preferences.  - Assess mother's knowledge and previous experience with breast feeding.  - Provide  information as needed about early infant feeding cues (e.g., rooting, lip smacking, sucking fingers/hand) versus late cue of crying.  - Discuss/demonstrate breast feeding aids (e.g., infant sling, nursing footstool/pillows, and breast pumps).  - Encourage mother/other family members to express feelings/concerns, and actively listen.  - Educate father/SO about benefits of breast feeding and how to manage common lactation challenges.  - Recommend avoidance of specific medications or substances incompatible with breast feeding.  - Assess and monitor for signs of nipple pain/trauma.  - Instruct and provide assistance with proper latch.  - Review techniques for milk expression (breast pumping) and storage of breast milk. Provide pumping equipment/supplies, instructions and assistance, as needed.  - Encourage rooming-in and breast feeding on demand.  - Encourage skin-to-skin contact.  - Provide LC support as needed.  - Assess for and manage engorgement.  - Provide breast feeding education handouts and information on community breast feeding support.   Outcome: Progressing  Goal: Establishment of adequate milk supply with medication/procedure interruptions  Description: INTERVENTIONS:  - Review techniques for milk expression (breast pumping).   - Provide pumping equipment/supplies, instructions, and assistance until it is safe to breastfeed infant.  Outcome: Progressing  Goal: Experiences normal breast weaning course  Description: INTERVENTIONS:  - Assess for and manage engorgement.  - Instruct on breast care.  - Provide comfort measures.  Outcome: Progressing  Goal: Appropriate maternal -  bonding  Description: INTERVENTIONS:  - Assess caregiver- interactions.  - Assess caregiver's emotional status and coping mechanisms.  - Encourage caregiver to participate in  daily care.  - Assess support systems available to mother/family.  - Provide /case management support as needed.  Outcome:  Progressing     Problem: Patient/Family Goals  Goal: Patient/Family Long Term Goal  Description: Patient's Long Term Goal:     Interventions:    - See additional Care Plan goals for specific interventions  Outcome: Completed  Goal: Patient/Family Short Term Goal  Description: Patient's Short Term Goal:    Interventions:     - See additional Care Plan goals for specific interventions  Outcome: Completed     Received pt via wheelchair to room 351. Bedside report received from ANDRÉS Diana. Pt transferred to bed without incident. Bed locked and in low position, side rails up x2. VSS. Baby Boy present at bedside in open crib. ID bands verified. Pt and family oriented to room, unit and call light. Call light within pt reach. Pt instructed to call for assistance out of bed or up to washroom. Pt verbalized understanding.

## 2025-01-03 NOTE — PROGRESS NOTES
Patient up to bathroom with assist x 2.  Unable to void at this time. Patient transferred to mother/baby room 351 per wheelchair in stable condition with baby and personal belongings.  Accompanied by significant other and staff.  Report given to Poornima mother/baby RN.

## 2025-01-03 NOTE — LACTATION NOTE
01/03/25 1215   Evaluation Type   Evaluation Type Inpatient   Problems identified   Problems identified Knowledge deficit   Breastfeeding goal   Breastfeeding goal To maintain breast milk feeding per patient goal   Maternal Assessment   Bilateral Breasts Symmetrical;Soft   Bilateral Nipples Colostrum difficult to express;Slightly everted/short   Prior breastfeeding experience (comment below) Primip   Breastfeeding Assistance Breastfeeding assistance provided with permission;Breast exam provided with permission;Hand expression provided with permission   Pain assessment   Pain, additional Pain w/initial sucks only;Pinching   Pain scale comment 3/10   Treatment of Sore Nipples Deeper latch techniques;Expressed breast milk  (silverettes)   Guidelines for use of:   Other (comment) LC assistance offered. Infant was skin to skin with mother and showing minimal hunger cues. LC educated on skin to skin benefits, feeding cues, gentle waking techniques, expected I&O's, and feeding patterns of a baby unde 24 hours old. LC attempted a latch but infant was too sleepy. Gentle waking techqniues provided and infant started rooting. LC assisted with a latch on the left side in laid back. Initial latches uncomfortable and mother complaining of pinching so LC provided minor adjustments to assist with a deep latch. Deep latch was achieved with a nutritive feeding pattern and audible swallows. Infant did start to slip off and LC reinforced mother's positioning and mother could tell the difference between a deep latch and a shallow one. LC educated on hand expression as well and when to incorporate spoon feedings. Reviewed hand book. All questions answered.

## 2025-01-03 NOTE — PROGRESS NOTES
[facility]  part of [facility]    OB/GYNE Progress Note      Cleopatra Chavez Patient Status:  Inpatient    1995 MRN [id number]   Location [facility] 3SE Attending Sherry Estevez, LG   Hosp Day # 1 PCP Wayne Merida MD        Assessment/Plan   Cleopatra Chavez is a 29 year old , day 0 after a vaginal delivery  Breastfeeding without difficulty   Discharge home anticipated   Postpartum teaching completed including danger signs and when to call the CNM       (normal spontaneous vaginal delivery) (HCC)  -- Stable but with moderate pain, requesting pain medication. Norco ordered x 2 doses. If pt still feels she needs the norco later tonight, will reevaluate.        Discussed with: nurse     patient and spouse     Plan discussed with patient who verbalizes understanding and agreement.        Subjective   Reports significant cramping and rectal pressure/pain while urinating. Requests something stronger for pain management. Denies excruciating pain or heavy bleeding.      Review of Systems:  Constitutional: Denies dizziness/lightheadedness when walking around room.  Genitourinary: Denies excessive pain or bleeding, issues with urination  Respiratory: Denies shortness of breath or difficulty breathing. Denies chest pain.  Psychiatric: Denies depression        Objective   Vital signs in last 24 hours:  Temp:  [97.6 °F (36.4 °C)-99 °F (37.2 °C)] 97.6 °F (36.4 °C)  Pulse:  [] 59  Resp:  [16] 16  BP: (108-136)/(68-95) 128/75    Input/Output:    Intake/Output Summary (Last 24 hours) at 1/3/2025 1001  Last data filed at 1/3/2025 0825  Gross per 24 hour   Intake --   Output 1911 ml   Net -1911 ml       Weight (Last 6):  Wt Readings from Last 6 Encounters:   25 191 lb 6.4 oz (86.8 kg)   24 191 lb 6.4 oz (86.8 kg)   24 191 lb 3.2 oz (86.7 kg)   24 187 lb (84.8 kg)   24 183 lb 12.8 oz (83.4 kg)   24 185 lb (83.9 kg)     Body mass index is  26.69 kg/m².     Exam:  Constitutional: Uncomfortable, bonding well with infant   Uterus: Fundus below umbilicus   Wound/Incision: Well-approximated, mild edema, moderate lochia rubra, no clots   Respiratory: Unlabored respirations   Breasts: Nipple/Areola normal bilaterally   Extremities: No edema   Psychiatric: Calm affect, appropriate     DVT Risk Score: Low risk        Results:     Lab Results   Component Value Date    TREPONEMALAB Nonreactive 01/02/2025    RAPIDHIVSCRN Nonreactive 01/02/2025    ABO O 01/02/2025    RH Positive 01/02/2025    WBC 9.5 01/02/2025    HGB 13.5 01/02/2025    HCT 40.3 01/02/2025    .0 01/02/2025    CREATSERUM 0.83 07/17/2024    BUN 7 (L) 07/17/2024     07/17/2024    K 3.6 07/17/2024     07/17/2024    CO2 22.0 07/17/2024     (H) 07/17/2024    CA 9.4 07/17/2024    T4F 1.3 07/01/2024    TSH 0.768 07/17/2024       Lab Results   Component Value Date    ESRML 20 07/17/2024    MG 2.0 09/25/2023       No results found.          Avtar Funez CNM  1/3/2025  10:01 AM

## 2025-01-04 VITALS
HEART RATE: 70 BPM | DIASTOLIC BLOOD PRESSURE: 72 MMHG | WEIGHT: 191.38 LBS | RESPIRATION RATE: 16 BRPM | TEMPERATURE: 98 F | BODY MASS INDEX: 27 KG/M2 | SYSTOLIC BLOOD PRESSURE: 114 MMHG

## 2025-01-04 LAB
BASOPHILS # BLD AUTO: 0.04 X10(3) UL (ref 0–0.2)
BASOPHILS NFR BLD AUTO: 0.2 %
DEPRECATED RDW RBC AUTO: 43.8 FL (ref 35.1–46.3)
EOSINOPHIL # BLD AUTO: 0.04 X10(3) UL (ref 0–0.7)
EOSINOPHIL NFR BLD AUTO: 0.2 %
ERYTHROCYTE [DISTWIDTH] IN BLOOD BY AUTOMATED COUNT: 13.5 % (ref 11–15)
HCT VFR BLD AUTO: 29.9 %
HGB BLD-MCNC: 10.4 G/DL
IMM GRANULOCYTES # BLD AUTO: 0.08 X10(3) UL (ref 0–1)
IMM GRANULOCYTES NFR BLD: 0.5 %
LYMPHOCYTES # BLD AUTO: 3.09 X10(3) UL (ref 1–4)
LYMPHOCYTES NFR BLD AUTO: 18.5 %
MCH RBC QN AUTO: 31.3 PG (ref 26–34)
MCHC RBC AUTO-ENTMCNC: 34.8 G/DL (ref 31–37)
MCV RBC AUTO: 90.1 FL
MONOCYTES # BLD AUTO: 1.02 X10(3) UL (ref 0.1–1)
MONOCYTES NFR BLD AUTO: 6.1 %
NEUTROPHILS # BLD AUTO: 12.39 X10 (3) UL (ref 1.5–7.7)
NEUTROPHILS # BLD AUTO: 12.39 X10(3) UL (ref 1.5–7.7)
NEUTROPHILS NFR BLD AUTO: 74.5 %
PLATELET # BLD AUTO: 229 10(3)UL (ref 150–450)
RBC # BLD AUTO: 3.32 X10(6)UL
WBC # BLD AUTO: 16.7 X10(3) UL (ref 4–11)

## 2025-01-04 RX ORDER — PSEUDOEPHEDRINE HCL 30 MG
100 TABLET ORAL 2 TIMES DAILY
Qty: 30 CAPSULE | Refills: 0 | Status: SHIPPED | OUTPATIENT
Start: 2025-01-04

## 2025-01-04 RX ORDER — IBUPROFEN 600 MG/1
600 TABLET, FILM COATED ORAL EVERY 6 HOURS
Qty: 40 TABLET | Refills: 0 | Status: SHIPPED | OUTPATIENT
Start: 2025-01-04

## 2025-01-04 RX ORDER — FERROUS SULFATE 325(65) MG
325 TABLET, DELAYED RELEASE (ENTERIC COATED) ORAL EVERY OTHER DAY
Qty: 30 TABLET | Refills: 0 | Status: SHIPPED | OUTPATIENT
Start: 2025-01-04

## 2025-01-04 NOTE — DISCHARGE SUMMARY
[facility]  part of [facility]    Discharge Summary    Cleopatra Chavez Patient Status:  Inpatient    1995 MRN [id number]   Location [facility] 3SE Attending Sherry Estevez CNM   Hosp Day # 2       Delivering OB Clinician: Sherry Estevez CNM    EDC: Estimated Date of Delivery: 1/15/25    Gestational Age: 38w2d    Antepartum complications:   Patient Active Problem List   Diagnosis    Family history of thyroid disorder    Pregnancy (HCC)     (normal spontaneous vaginal delivery) (HCC)       Date of Delivery: 1/3/2025 Time of Delivery: 3:43 AM    Delivery Type: spontaneous vaginal delivery    Baby: Liveborn male   Information for the patient's :  Mason Chavez [[id number]]   8 lb 2.5 oz (3.7 kg)  Apgars:  1 minute: 9  5 minutes: 910 minutes:       Intrapartum Complications: None    Admit Date: 2025    Discharge Date: 2025    Hospital Course: No complications. Routine delivery and postpartum care    Discharged Condition: stable    Disposition: home    Plan:     Follow-up appointment in 2 weeks with LG Shaikh CNM  2025  9:51 AM

## 2025-01-04 NOTE — LACTATION NOTE
This note was copied from a baby's chart.     01/04/25 1230   Evaluation Type   Evaluation Type Inpatient   Problems & Assessment   Problems Diagnosed or Identified 37-38 weeks gestation;Shallow latch   Infant Assessment Hunger cues present   Muscle tone Appropriate for GA   Feeding Assessment   Summary Current Feeding Breastfeeding exclusively   Breastfeeding Assessment Assisted with breastfeeding w/mother's permission;Deep latch achieved and observed;Calm and ready to breastfeed;Tolerated feeding well;Coordinated suck/swallow   Breastfeeding lasted # of minutes 10   Breastfeeding Positions cross cradle;right breast   Latch 1   Audible Sucks/Swallows 2   Type of Nipple 2   Comfort (Breast/Nipple) 1   Hold (Positioning) 1   LATCH Score 7   Other (comment) Called to room to view a latch, infant able to attain a deep latch in cross cradle position to the right breast, shown breast compression and gentle waking movements, slight misshapen nipple post feed, encouraged to call if needed.

## 2025-01-04 NOTE — LACTATION NOTE
01/04/25 1230   Evaluation Type   Evaluation Type Inpatient   Problems identified   Problems identified Knowledge deficit   Breastfeeding goal   Breastfeeding goal To maintain breast milk feeding per patient goal   Maternal Assessment   Bilateral Breasts Soft;Symmetrical   Bilateral Nipples Slightly everted/short;Sore;Bleeding   Left Nipple Bleeding;Sore  (hx bleeding)   Prior breastfeeding experience (comment below) Primip   Breastfeeding Assistance Breastfeeding assistance provided with permission   Guidelines for use of:   Current use of pump: not indicated at this time   Other (comment) Called to room to view a latch, infant able to attain a deep latch in cross cradle position to the right breast, shown breast compression and gentle waking movements, slight misshapen nipple post feed, encouraged to call if needed.

## 2025-01-04 NOTE — PROGRESS NOTES
[facility]  part of [facility]    OB/GYNE Progress Note      Cleopatra Chavez Patient Status:  Inpatient    1995 MRN [id number]   Location [facility] 3SE Attending Sherry Estevez, LG   Hosp Day # 2 PCP Wayne Merida MD        Assessment/Plan   Cleopatra Chavez is a 29 year old , day 1 after a vaginal delivery  Breastfeeding without difficulty. Having some nipple pain at start of latch. Has been using silverettes which seem to be helping. Plan to work wiith lactation today  Requesting early discharge today  Postpartum teaching completed including danger signs and when to call the CNM       (normal spontaneous vaginal delivery) (HCC)  Normal PPD #1    Postpartum anemia   Hgb 10.4   Asymptomatic   Start oral iron        Discussed with: nurse     patient and spouse     Plan discussed with patient who verbalizes understanding and agreement.        Subjective   Currently she denies excessive bleeding or pain. No clots. Denies SOB, chest pain, HA, dizziness. + void.   Support at home: partner and family  Has car seat  Undecided on birth control but likely condoms    Review of Systems:  Constitutional: Denies   Genitourinary: Denies   Respiratory: Denies   Psychiatric: Denies         Objective   Vital signs in last 24 hours:  Temp:  [97.5 °F (36.4 °C)-98.2 °F (36.8 °C)] 97.5 °F (36.4 °C)  Pulse:  [59-75] 70  Resp:  [16] 16  BP: (104-128)/(69-81) 114/72    Input/Output:  No intake or output data in the 24 hours ending 25 0947    Weight (Last 6):  Wt Readings from Last 6 Encounters:   25 191 lb 6.4 oz (86.8 kg)   24 191 lb 6.4 oz (86.8 kg)   24 191 lb 3.2 oz (86.7 kg)   24 187 lb (84.8 kg)   24 183 lb 12.8 oz (83.4 kg)   24 185 lb (83.9 kg)     Body mass index is 26.69 kg/m².     Exam:  Constitutional: Comfortable and bonding well with infant   Uterus: Fundus firm, below umbilicus   Wound/Incision: Well-approximated, no swelling or  edema, small lochia rubra, no clots   Respiratory: Unlabored respirations   Extremities: No edema. No evidence of DVT on exam   Psychiatric: Calm affect, appropriate     DVT Risk Score: Low risk        Results:     Lab Results   Component Value Date    TREPONEMALAB Nonreactive 01/02/2025    RAPIDHIVSCRN Nonreactive 01/02/2025    ABO O 01/02/2025    RH Positive 01/02/2025    WBC 16.7 (H) 01/04/2025    HGB 10.4 (L) 01/04/2025    HCT 29.9 (L) 01/04/2025    .0 01/04/2025    CREATSERUM 0.83 07/17/2024    BUN 7 (L) 07/17/2024     07/17/2024    K 3.6 07/17/2024     07/17/2024    CO2 22.0 07/17/2024     (H) 07/17/2024    CA 9.4 07/17/2024    T4F 1.3 07/01/2024    TSH 0.768 07/17/2024       Lab Results   Component Value Date    ESRML 20 07/17/2024    MG 2.0 09/25/2023       No results found.          Yoly Mckeon CNM  1/4/2025  9:47 AM

## 2025-01-04 NOTE — PROGRESS NOTES
CONSENT FOR  CIRCUMCISION    Discussed with the infant's mother that this is an elective procedure. Discussed AAP position on circumcision, discussed it may lower risk of UTI and STI, has not been proven to interfere with sexual functioning. Discussed risks including bleeding, infection, injury to the penis and possible need for re-operation. All questions answered.

## 2025-01-04 NOTE — PLAN OF CARE
Problem: Patient Centered Care  Goal: Patient preferences are identified and integrated in the patient's plan of care  Description: Interventions:  - What would you like us to know as we care for you?   - Provide timely, complete, and accurate information to patient/family  - Incorporate patient and family knowledge, values, beliefs, and cultural backgrounds into the planning and delivery of care  - Encourage patient/family to participate in care and decision-making at the level they choose  - Honor patient and family perspectives and choices  Outcome: Progressing     Problem: POSTPARTUM  Goal: Long Term Goal:Experiences normal postpartum course  Description: INTERVENTIONS:  - Assess and monitor vital signs and lab values.  - Assess fundus and lochia.  - Provide ice/sitz baths for perineum discomfort.  - Monitor healing of incision/episiotomy/laceration, and assess for signs and symptoms of infection and hematoma.  - Assess bladder function and monitor for bladder distention.  - Provide/instruct/assist with pericare as needed.  - Provide VTE prophylaxis as needed.  - Monitor bowel function.  - Encourage ambulation and provide assistance as needed.  - Assess and monitor emotional status and provide social service/psych resources as needed.  - Utilize standard precautions and use personal protective equipment as indicated. Ensure aseptic care of all intravenous lines and invasive tubes/drains.  - Obtain immunization and exposure to communicable diseases history.  Outcome: Progressing  Goal: Optimize infant feeding at the breast  Description: INTERVENTIONS:  - Initiate breast feeding within first hour after birth.   - Monitor effectiveness of current breast feeding efforts.  - Assess support systems available to mother/family.  - Identify cultural beliefs/practices regarding lactation, letdown techniques, maternal food preferences.  - Assess mother's knowledge and previous experience with breast feeding.  - Provide  information as needed about early infant feeding cues (e.g., rooting, lip smacking, sucking fingers/hand) versus late cue of crying.  - Discuss/demonstrate breast feeding aids (e.g., infant sling, nursing footstool/pillows, and breast pumps).  - Encourage mother/other family members to express feelings/concerns, and actively listen.  - Educate father/SO about benefits of breast feeding and how to manage common lactation challenges.  - Recommend avoidance of specific medications or substances incompatible with breast feeding.  - Assess and monitor for signs of nipple pain/trauma.  - Instruct and provide assistance with proper latch.  - Review techniques for milk expression (breast pumping) and storage of breast milk. Provide pumping equipment/supplies, instructions and assistance, as needed.  - Encourage rooming-in and breast feeding on demand.  - Encourage skin-to-skin contact.  - Provide LC support as needed.  - Assess for and manage engorgement.  - Provide breast feeding education handouts and information on community breast feeding support.   Outcome: Progressing  Goal: Establishment of adequate milk supply with medication/procedure interruptions  Description: INTERVENTIONS:  - Review techniques for milk expression (breast pumping).   - Provide pumping equipment/supplies, instructions, and assistance until it is safe to breastfeed infant.  Outcome: Progressing  Goal: Experiences normal breast weaning course  Description: INTERVENTIONS:  - Assess for and manage engorgement.  - Instruct on breast care.  - Provide comfort measures.  Outcome: Progressing  Goal: Appropriate maternal -  bonding  Description: INTERVENTIONS:  - Assess caregiver- interactions.  - Assess caregiver's emotional status and coping mechanisms.  - Encourage caregiver to participate in  daily care.  - Assess support systems available to mother/family.  - Provide /case management support as needed.  Outcome:  Progressing

## 2025-01-04 NOTE — LACTATION NOTE
01/04/25 1145   Evaluation Type   Evaluation Type Inpatient   Problems identified   Problems identified Knowledge deficit   Breastfeeding goal   Breastfeeding goal To maintain breast milk feeding per patient goal   Maternal Assessment   Bilateral Breasts Soft;Symmetrical   Bilateral Nipples Slightly everted/short;Sore;Bleeding   Left Nipple Bleeding;Sore   Prior breastfeeding experience (comment below) Primip   Breastfeeding Assistance Breastfeeding assistance provided with permission   Pain assessment   Pain, additional Pinching;Pain location   Pain Location Nipples   Location/Comment had some bleeding on her left nipple   Treatment of Sore Nipples Hydrogel dressings as directed;Lanolin;Deeper latch techniques   Guidelines for use of:   Breast pump type   (mom katelyn)   Current use of pump: not indicated at this time   Suggested use of pump Pump each time a supplement is offered;Pump if infant is not latching to breast   Other (comment) Mom said that breastfeeding is going well, had some bleeding of her left nipple, has gel pads, left number and encouraged to call to view a latch.

## 2025-01-05 NOTE — PROGRESS NOTES
Discharge order received from MD.     Discharge instructions and medications reviewed with patient. ID band matched with baby band. Follow up instructions with OB given. Mother verbalizes understanding of instructions and is in stable condition. Mom to be discharged home with infant when ready.

## 2025-01-06 ENCOUNTER — PATIENT OUTREACH (OUTPATIENT)
Dept: CASE MANAGEMENT | Age: 30
End: 2025-01-06

## 2025-01-06 NOTE — PROGRESS NOTES
[facility] Post Partum appointment request (delivered 01/03)    Sherry Estevez CNM  [address] [address]  [phone]  2w virtual -  6w -     Attempt #1:  Left message on voicemail for patient to call transitions specialist back to schedule follow up appointments. Provided Transitions specialist scheduling phone number [phone].

## 2025-01-07 NOTE — PROGRESS NOTES
[facility] Post Partum appointment request (delivered 01/03)     Sherry Estevez CNM  [address] [place], [place] [id number]  [phone]  2w - Tue 01/21 @1:30pm (patient requested in-person)  6w - Tue 02/18 @1:30pm  Confirmed w/pt  Closing encounter

## 2025-01-21 ENCOUNTER — LAB ENCOUNTER (OUTPATIENT)
Dept: LAB | Age: 30
End: 2025-01-21
Attending: ADVANCED PRACTICE MIDWIFE
Payer: COMMERCIAL

## 2025-01-21 ENCOUNTER — POSTPARTUM (OUTPATIENT)
Dept: OBGYN CLINIC | Facility: CLINIC | Age: 30
End: 2025-01-21
Payer: COMMERCIAL

## 2025-01-21 VITALS
HEART RATE: 101 BPM | HEIGHT: 71 IN | SYSTOLIC BLOOD PRESSURE: 115 MMHG | DIASTOLIC BLOOD PRESSURE: 80 MMHG | WEIGHT: 170 LBS | BODY MASS INDEX: 23.8 KG/M2

## 2025-01-21 DIAGNOSIS — E16.2 HYPOGLYCEMIA: ICD-10-CM

## 2025-01-21 LAB
DEPRECATED RDW RBC AUTO: 46.8 FL (ref 35.1–46.3)
ERYTHROCYTE [DISTWIDTH] IN BLOOD BY AUTOMATED COUNT: 13.5 % (ref 11–15)
HCT VFR BLD AUTO: 41.2 %
HGB BLD-MCNC: 12.8 G/DL
MCH RBC QN AUTO: 29.1 PG (ref 26–34)
MCHC RBC AUTO-ENTMCNC: 31.1 G/DL (ref 31–37)
MCV RBC AUTO: 93.6 FL
PLATELET # BLD AUTO: 560 10(3)UL (ref 150–450)
RBC # BLD AUTO: 4.4 X10(6)UL
WBC # BLD AUTO: 5.4 X10(3) UL (ref 4–11)

## 2025-01-21 PROCEDURE — 85027 COMPLETE CBC AUTOMATED: CPT

## 2025-01-21 PROCEDURE — 36415 COLL VENOUS BLD VENIPUNCTURE: CPT

## 2025-01-21 RX ORDER — PROCHLORPERAZINE 25 MG/1
1 SUPPOSITORY RECTAL
Qty: 3 EACH | Refills: 11 | Status: SHIPPED | OUTPATIENT
Start: 2025-01-21

## 2025-01-28 ENCOUNTER — TELEPHONE (OUTPATIENT)
Dept: OBGYN UNIT | Facility: HOSPITAL | Age: 30
End: 2025-01-28

## 2025-02-03 NOTE — PROGRESS NOTES
Subjective: Cleopatra is 2 weeks postpartum after a vaginal delivery of a baby boy.  See L&D delivery summary for birth statistics.  She is without concerns today. Bleeding is decreasing and not experiencing any excessive pain.    Breastfeeding successfully and reports infant is experiencing adequate weight gain.    She denies any signs/symptoms of postpartum depression and has adequate family support.  Denies any abuse.    Contraceptive plan: undecided    Constitutional: negative; feels well  Eyes: negative; denies blurred or double vision  ENT: negative; denies nasal congestion or sinus pain  Cardiovascular: negative; denies chest pain or palpitations  Respiratory: negative; denies shortness of breath  Gastrointestinal: negative; denies heartburn, abdominal pain, diarrhea or constiptation  Genitourinary: negative; denies dysuria, incontinence, vaginal itching or discharge.    Musculoskeletal: negative; denies back pain.  Skin/Breast: negative; Denies any breast pain, lumps, or discharge.  Denies any skin lesions.  Neurological: negative; denies headaches, extremity weakness or numbness.  Psychiatric: negative; denies depression or anxiety.  Endocrine:  negative; denies any thyroid history; denies excessive thirst or urination.  Heme/Lymph: negative; denies history of anemia, easy bruising or bleeding.    Objective:   Vitals:    01/21/25 1338   BP: 115/80   Pulse: 101     Wt Readings from Last 6 Encounters:   01/21/25 170 lb (77.1 kg)   01/02/25 191 lb 6.4 oz (86.8 kg)   12/31/24 191 lb 6.4 oz (86.8 kg)   12/27/24 191 lb 3.2 oz (86.7 kg)   12/20/24 187 lb (84.8 kg)   12/04/24 183 lb 12.8 oz (83.4 kg)       OBGyn Exam     Assessment/Plan:   2 weeks postpartum, stable. Breast / Formula feeding without difficulty  Contraception: undecided  Today's Plan: labs  Return to clinic: 4 weeks    Counseling included:   Signs/symptoms of postpartum depression  Postpartum danger signs  Lactation support and troubleshooting  Maternal  and family adaption  Sleep/rest strategies  Sexuality  Infant safety and care  Parenting concerns  Occupational concerns  Contraception    Cleopatra verbalized understanding of plan of care. All questions answered. No barriers to learning identified.

## 2025-02-18 ENCOUNTER — TELEPHONE (OUTPATIENT)
Dept: OBGYN CLINIC | Facility: CLINIC | Age: 30
End: 2025-02-18

## 2025-02-18 ENCOUNTER — POSTPARTUM (OUTPATIENT)
Dept: OBGYN CLINIC | Facility: CLINIC | Age: 30
End: 2025-02-18

## 2025-02-18 ENCOUNTER — TELEPHONE (OUTPATIENT)
Dept: PHYSICAL THERAPY | Facility: HOSPITAL | Age: 30
End: 2025-02-18

## 2025-02-18 VITALS
DIASTOLIC BLOOD PRESSURE: 69 MMHG | WEIGHT: 169 LBS | BODY MASS INDEX: 22.89 KG/M2 | HEIGHT: 72 IN | SYSTOLIC BLOOD PRESSURE: 104 MMHG | HEART RATE: 82 BPM

## 2025-02-18 DIAGNOSIS — E16.2 HYPOGLYCEMIA: ICD-10-CM

## 2025-02-18 NOTE — PROGRESS NOTES
Patient here for postpartum check-up. Vaginal delivery @ 6 weeks ago with CNM . Breastfeeding exclusively, on demand. Baby with adequate weight gain.   Denies fevers, chills, body aches and flu-like symptoms.  Denies abdominal pain, no pelvic pain, reports no vaginal bleeding. Bleeding stopped 1 week ago     Denies dysuria, urinary frequency and urinary incontinence.  Denies constipation, painful bowel movements and fecal incontinence.  Denies symptoms of postpartum depression including mood, affect, appetite / activity level changes. Has adequate support at home.     Perineum concerns: none  Depression / GADscreen: see flow  Considering condoms for BC method    O: /69 (BP Location: Right arm, Patient Position: Sitting, Cuff Size: adult)   Pulse 82   Ht 6' (1.829 m)   Wt 169 lb (76.7 kg)   LMP 04/04/2024   Breastfeeding Yes   BMI 22.92 kg/m²   General: well groomed, Alert and oriented x 3    Neck: supple, no nodes, euthyroid  Lungs: normal effort  Breasts: bilaterally lactating, no masses, no skin redness, nipples intact with no trauma    Abdomen: non-tender, no masses, no hernia,  : perineum intact, introitus normal, vaginal walls pink, physiologic discharge; cervix intact no lesions,  uterus non-tender, normal size, no adnexal masses or tenderness; neg CMT  Psych: pleasant, normal affect      A: Normal PP involution      Breastfeeding   P: Continue to breastfeed exclusively, continue PNV while breastfeeding and for preconception.       condoms for BC method  Follow-up with routine annual exam i

## 2025-02-18 NOTE — TELEPHONE ENCOUNTER
Reviewed Jose Back message with pt. Pt states she is not planning on going on birth control at this time. Maggie informed of this information.

## 2025-02-19 ENCOUNTER — TELEPHONE (OUTPATIENT)
Dept: OBGYN CLINIC | Facility: CLINIC | Age: 30
End: 2025-02-19

## 2025-02-19 ENCOUNTER — TELEPHONE (OUTPATIENT)
Dept: PHYSICAL THERAPY | Age: 30
End: 2025-02-19

## 2025-02-19 DIAGNOSIS — N39.3 STRESS INCONTINENCE: Primary | ICD-10-CM

## 2025-02-19 NOTE — TELEPHONE ENCOUNTER
Patient asking status of pelvic floor therapy referral.  Patient notified in-process with provider.    No callback required until referral is authorized or if something is needed from patient.

## 2025-02-19 NOTE — TELEPHONE ENCOUNTER
Pt requesting order for pelvic floor eval and therapy. Routing order for review and approval.     Please advise on Dx code. Pt has apt this Friday 2/21.

## 2025-02-20 ENCOUNTER — PATIENT MESSAGE (OUTPATIENT)
Dept: OBGYN CLINIC | Facility: CLINIC | Age: 30
End: 2025-02-20

## 2025-02-20 NOTE — TELEPHONE ENCOUNTER
Patient calling to follow up on this. Has an appointment for this on Friday 2/21. Referral needs to be signed. Please call.

## 2025-02-21 ENCOUNTER — OFFICE VISIT (OUTPATIENT)
Dept: PHYSICAL THERAPY | Age: 30
End: 2025-02-21
Attending: ADVANCED PRACTICE MIDWIFE
Payer: COMMERCIAL

## 2025-02-21 DIAGNOSIS — N39.3 STRESS INCONTINENCE: Primary | ICD-10-CM

## 2025-02-21 PROCEDURE — 97112 NEUROMUSCULAR REEDUCATION: CPT

## 2025-02-21 PROCEDURE — 97162 PT EVAL MOD COMPLEX 30 MIN: CPT

## 2025-02-21 PROCEDURE — 97140 MANUAL THERAPY 1/> REGIONS: CPT

## 2025-02-21 NOTE — PROGRESS NOTES
MUSCULOSKELETAL AND PELVIC FLOOR EVALUATION:     Diagnosis:   Stress incontinence (N39.3) Patient:  Cleopatra Chavez (30 year old, female)        Referring Provider: Sherry Estevez  Today's Date   2025    Precautions:  None   Date of Evaluation: 25  Next MD visit: Not made  Date of Surgery: NA     PATIENT SUMMARY   Summary of chief complaints: pelvic pain, voidng difficulty, vaginal bulging  History of current condition:  Baby is 7 weeks old. Vaginal delivery, Grade 2 tear.  Bowels are good, regular no straining.  Voiding is easier than before. Drips on the toilet seat a few times per week.   Can empty bladder.  More air in the vagina. More uncomfortable with intercourse. Felt like vaginal wall is sore. Felt a little dry.  Feels like she can squeeze PF. Has a bulge at the vaginal opening    Pain level: current 0 /10, at best 0 /10, at worst 2 /10  Description of symptoms: vaginal irriation   Occupation: nurse   Leisure activities/Hobbies: working out   Prior level of function: No limitiations  Current limitations: emptying bladder, pain with intercourse, vaginal bulging  Pt goals: improve strength  Pregnant Now:     OB History    Para Term  AB Living   1 1 1 0 0 1   SAB IAB Ectopic Multiple Live Births   0 0 0 0 1      # Outcome Date GA Lbr Scott/2nd Weight Sex Type Anes PTL Lv   1 Term 25 38w2d 11:41 / 02:02 8 lb 2.5 oz M NORMAL SPONT Local N DAISY      Name: [place] B Chavez      Apgar1: 9  Apgar5: 9     Urodynamic Test: No   Manometry: No   PFDI 20    Scores   POPDI 6:    0   CRAD 8:    6.25   LOW 6:    4.17   Summary:    10.42      Outpatient Therapy Pelvic Health Intake       Question 2025  9:23 AM CST - Filed by Patient 10/27/2024  6:40 PM CST - Filed by Patient    Do you usually experience pressure in the lower abdomen? Not present Moderately    Do you usually experience heaviness or dullness in the pelvic area? Not present Somewhat    Do you usually have a bulge or  something falling out that you can see or feel in your vaginal area? Not present Not present    Do you ever have to push on the vagina or around the rectum to have or complete a bowel movement? Not present Not present    Do you usually experience a feeling of incomplete bladder emptying? Not present Somewhat    Do you ever have to push up on a bulge in the vaginal area with your fingers to start or complete urination? Not present Not present    Please provide details on the following urinary history / complaints      Frequency of urination: # of times/day 8 7    Frequency of urination: # of times/night 2 2    When you have the urge to urinate, how long can you delay before you have to go to the toilet? (# of minutes or hours) 20 60    Do you have a history of urinary tract infections: No No    Do you have a history of urine loss (select all that apply) after childbirth       How many times a day does leakage occur? 0 0    If you have leakage, what type(s) of protective device(s) do you use? (Select all that apply) None None    On average, how many pads do you use each day? 0 0    Do you soak the pad fully? No       Do you change the pad each time it is wet? Yes       Do you experience any of these symptoms? (Select all that apply) Have difficulty stopping the urine stream Have difficulty initiating a urine stream    Do you usually experience frequent urination? Not present Somewhat    Do you usually experience urine leakage associated with a feeling of urgency, that is, a strong sensation of needing to go to the bathroom? Not present Not at all    Do you usually experience urine leakage related to coughing, sneezing, or laughing? Not at all Not at all    Do you usually experience small amounts of urine leakage (that is, drops)? Not present Somewhat    Do you usually experience difficulty empyting your bladder? Not present Somewhat    Do you usually experience pain or discomfort in the lower abdomen or genital  region? Not present Not at all    Have you ever had any of the following treatment:      Have you ever done exercises to control urine loss? If so, for how long? no       Has your doctor ever prescribed any medication for urine loss? No No    Have you had any surgical procedures to treat urine loss? No No    Please provide details on the following bowel history / complaints.      How many bowel movements do you have per day? 2 1    How many bowel movements do you have per night? 0 0    Do you experience diarrhea? If yes, how often? 0 0    Do you feel you need to strain too hard to have a bowel movement? Not present Somewhat    Do you feel you have not completely emptied your bowels at the end of a bowel movement? Not present Somewhat    Do you usually lose stool beyond your control if your stool is well formed? Not present Not present    Do you usually lose stool beyond your control if your stool is loose? Not present Not present    Do you usually lose gas from the rectum beyond your control? Not present Not at all    Do you usually have pain when you pass your stool? Somewhat Not at all    Do you experience strong sense of urgency and have to rush to bathroom for bowel movement? Not present Somewhat    Does part of bowel ever pass through rectum and bulge outside during/after bowel movement? Not present Not present    Please provide details on your daily fluid/food intake.      On average, what is your daily fluid intake (1 glass=8ounces)? (# of glasses per day) 8 8    How many are caffeinated? (# of glasses per day) 1 1    Do you restrict fluids because of incontenence (leakage)? No No    Do you include fiber in your diet (fruits, vegetables, bran, etc.)? Yes Yes    Psychosocial information      Do you live alone? No No    Which recreational activities do you participate in if any? exercise Walking, lifting weights, sexual activity    Have you had to restrict your activities due to your pelvic health concerns? No  No    On a scale of 0 (no impairments) to 10 (severe impairments), what are your feelings about your urinary or bowel incontinence or pelvic pain? 0 3    Do you have pain with intercourse? Yes No    Have you had any changes in intimate relationships/sexual function due to your symptoms?  No No    Your personal safety is of utmost importance to us at [facility], please answer the following questions:      Are you being hurt, frightened, demeaned, or taken advantage of by anyone at your home or in your life?  No No    Have you recently had thoughts of hurting yourself? No No    Have you tried to hurt yourself in the past?  No No    Pelvic Organ Prolapse Distress Inventory 6 Score (range: 0 - 100) 0 29.17    Colorectal-Anal Distress Inventory 8 Score (range: 0 - 100) 6.25 25    Urinary Distress Inventory 6 Score (range: 0 - 100) 4.17 37.5    PFDI Summary Score (range: 0 - 300) 10.42 91.67            Past medical history was reviewed with Cleopatra.  Significant findings include:    Imaging/Tests: None   Cleopatra  has no past medical history on file.  She  has a past surgical history that includes wisdom teeth removed (Bilateral, 2013) and other surgical history (2011).    ASSESSMENT  Cleopatra presents to physical therapy evaluation with primary c/o pelvic pain, voidng difficulty, vaginal bulging. The results of the objective tests and measures show decreased PF strength and endurnace, TTP in layers 1,2,3, and +buging of posterior vaginal wall.. Functional deficits include but are not limited to emptying bladder, pain with intercourse, vaginal bulging. Signs and symptoms are consistent with diagnosis of Stress incontinence (N39.3). Pt and PT discussed evaluation findings, pathology, POC and HEP.  Pt voiced understanding and performs HEP correctly without reported pain. Skilled Physical Therapy is medically necessary to address the above impairments and reach functional goals.    OBJECTIVE:    Musculoskeletal  Diastasis Recti (finger width depth while contracted):    Above Umbilicus: 2FW    Umbilicus: 2FW    Below Umbilicus: 1FW       Informed consent for internal pelvic evaluation given:Yes     External Observation:   Voluntary contraction: present   Voluntary relaxation: present   Involuntary contraction: present   Involuntary relaxation: present   Mons pubis: WNL   Labia majora: WNL  Labia minora: WNL   Urethral meatus: WNL   Introitus: WNL   Perineal body: WNL    Internal Examination   Scar:      Pelvic Floor Muscle strength: (PERF= Power/Endurance/Reps/Fast) MMT:  Power Endurance REPS Fast    3  3  4  3     External Anal Sphincter:  WNL   Accessory Muscle Use:   none     Tissue Laxity Test:  Anterior Wall: min  Posterior Wall: mod   Apical: min     Eccentric lengthening contraction: dec      Internal Palpation:  WNL except  Superficial Transverse Perineal  moderate restriction   Bulbocavernosus  moderate restriction   Ischiocavernosus moderate restriction   Deep Transverse Perineal moderate restriction   Compress Urethra/Sphincter moderate restriction   Urethrovaginalis moderate restriction   Pubococcygeus/Pubovaginalis moderate restriction   Iliococcygeus moderate restriction   Coccygeus moderate restriction   Piriformis (palpated externally)  moderate restriction   Obturator Internus  moderate restriction   Pelvic Clock moderate restriction       Today's Treatment and Response:   Pt education was provided on exam findings, treatment diagnosis, treatment plan, expectations, and prognosis.  Today's Treatment       2/21/2025   PT Treatment   Neuro Re-Ed Diaphragmatic breathing  TA with exhale  Contract relax bulge   Manual Therapy DTM and manual release to all layers of PF for downtraining   Neuro Re-Ed Min 15   Manual Therapy Min 15   Evaluation Min 20   Total of Timed Procedures 30   Total of Service Based 20   Total Treatment Time 50          No data to display                 Patient was  instructed in and issued a HEP for: Contract relax bulge, low abs    Charges:  PT EVAL: Moderate Complexity, 1EVAL, 1NM, 1MAN  In agreement with evaluation findings and clinical rationale, this evaluation involved MODERATE COMPLEXITY decision making due to 1-2 personal factors/comorbidities, 3 or more body structures involved/activity limitations, and evolving symptoms as documented in the evaluation.                                                                       PLAN OF CARE:    Goals: (to be met in 10 visits)   Goals       Therapy Goals      Not Met Progress  Toward Partially Met Met   Patient will improve PERF score to at least 5/8/9//10 for improved pelvic floor function and pelvic organ support. [] [] [] []   Patient will demonstrate ability to coordinate a PFM contraction with 20 sit to stands without JAKOB symptoms for return to performing functional transfers without leakage. [] [] [] []   Patient will report use of the KNACK at least 75% of the time to mitigate JAKOB with increases in IAP and to restore cough reflex. [] [] [] []   Patient will report adherence to HEP for continued exercise benefits following cessation of PT. [] [] [] []                   Frequency / Duration: Patient will be seen 1x/week or a total of 10  visits over a 90 day period. Treatment will include: Manual Therapy; Neuromuscular Re-education; Self-Care Home Management; Therapeutic Activities; Therapeutic Exercise; Home Exercise Program instruction    Education or treatment limitation: None   Rehab Potential: excellent         Patient/Family/Caregiver was advised of these findings, precautions, and treatment options and has agreed to actively participate in planning and for this course of care.    Thank you for your referral. Please co-sign or sign and return this letter via fax as soon as possible to [phone]. If you have any questions, please contact me at Dept: [phone]    Sincerely,  Electronically signed by therapist:  Lynnette Fabian, PT  Physician's certification required: Yes  I certify the need for these services furnished under this plan of treatment and while under my care.    X___________________________________________________ Date____________________    Certification From: 2/21/2025  To:5/22/2025

## 2025-02-24 NOTE — TELEPHONE ENCOUNTER
PA denied. Payer notes coverage is only provided for diagnoses of diabetes and/or gestational diabetes.

## 2025-03-03 RX ORDER — FERROUS SULFATE 325(65) MG
1 TABLET, DELAYED RELEASE (ENTERIC COATED) ORAL EVERY OTHER DAY
Qty: 30 TABLET | Refills: 0 | Status: SHIPPED | OUTPATIENT
Start: 2025-03-03

## 2025-03-21 ENCOUNTER — PATIENT MESSAGE (OUTPATIENT)
Dept: OBGYN CLINIC | Facility: CLINIC | Age: 30
End: 2025-03-21

## (undated) NOTE — LETTER
[facility]  Administration of Anesthesia  I, Cleopatra Chavez agree to be cared for by a physician anesthesiologist alone and/or with a nurse anesthetist, who is specially trained to monitor me and give me medicine to put me to sleep or keep me comfortable during my procedure    I understand that my anesthesiologist and/or anesthetist is not an employee or agent of [facility] or [employer]. He or she works for [facility]    As the patient asking for anesthesia services, I agree to:  Allow the anesthesiologist (anesthesia doctor) to give me medicine and do additional procedures as necessary. Some examples are: Starting or using an “IV” to give me medicine, fluids or blood during my procedure, and having a breathing tube placed to help me breathe when I’m asleep (intubation). In the event that my heart stops working properly, I understand that my anesthesiologist will make every effort to sustain my life, unless otherwise directed by [facility] Do Not Resuscitate documents.  Tell my anesthesia doctor before my procedure:  If I am pregnant.  The last time that I ate or drank.  iii. All of the medicines I take (including prescriptions, herbal supplements, and pills I can buy without a prescription (including street drugs/illegal medications). Failure to inform my anesthesiologist about these medicines may increase my risk of anesthetic complications.  iv.If I am allergic to anything or have had a reaction to anesthesia before.  I understand how the anesthesia medicine will help me (benefits).  I understand that with any type of anesthesia medicine there are risks:  The most common risks are: nausea, vomiting, sore throat, muscle soreness, damage to my eyes, mouth, or teeth (from breathing tube placement).  Rare risks include: remembering what happened during my procedure, allergic reactions to medications, injury to my airway, heart, lungs, vision, nerves, or  muscles and in extremely rare instances death.  My doctor has explained to me other choices available to me for my care (alternatives).  Pregnant Patients (“epidural”):  I understand that the risks of having an epidural (medicine given into my back to help control pain during labor), include itching, low blood pressure, difficulty urinating, headache or slowing of the baby’s heart. Very rare risks include infection, bleeding, seizure, irregular heart rhythms and nerve injury.  Regional Anesthesia (“spinal”, “epidural”, & “nerve blocks”):  I understand that rare but potential complications include headache, bleeding, infection, seizure, irregular heart rhythms, and nerve injury.    _____________________________________________________________________________  Patient (or Representative) Signature/Relationship to Patient  Date   Time    _____________________________________________________________________________   Name (if used)    Language/Organization   Time    _____________________________________________________________________________  Nurse Anesthetist Signature     Date   Time  _____________________________________________________________________________  Anesthesiologist Signature     Date   Time  I have discussed the procedure and information above with the patient (or patient’s representative) and answered their questions. The patient or their representative has agreed to have anesthesia services.    _____________________________________________________________________________  Witness        Date   Time  I have verified that the signature is that of the patient or patient’s representative, and that it was signed before the procedure  Patient Name: Cleopatra Magaña Scott     : 1995                 Printed: 2025 at 4:45 PM    Medical Record #: [id number]                                            Page 1 of 1  ----------ANESTHESIA CONSENT----------

## (undated) NOTE — LETTER
Patient Name: Cleopatra Chavez  YOB: 1995          MRN :  [id number]  Date:  10/28/2024  Referring Physician:  Idalia Saenz    MUSCULOSKELETAL AND PELVIC FLOOR EVALUATION:     Diagnosis:   28 weeks gestation of pregnancy (HCC) (Z3A.28)  Pelvic pressure in pregnancy (HCC) (O26.899,R10.2)  Stress incontinence (N39.3)         Referring Provider: Idalia Saenz  Date of Evaluation:    10/28/2024    Precautions:   pregnant Next MD visit:   none scheduled  Date of Surgery: n/a     PATIENT SUMMARY   Cleopatra Chavez is a 29 year old female  who presents to therapy today with complaints of leakage when coughed, having more hesitation with emptying bladder, has had urine dribbling, having a lot pressure in pelvic region. Has started taking colace when constipated which also increases difficulty starting stream and has more pelvic pressure.   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 Pt goals include learn exercises to do while pregnant.  Past medical history was reviewed with Cleopatra. Significant findings include  has no past medical history on file.     URINARY HABITS  Types of symptoms: stress incontinence, nocturia, and other: hesitation  Events associated with the onset of urinary complaints: pregnancy  Abdominal/Vaginal Pressure complaints: yes  Urinary Frequency: 7x/day  Urine Stream: hesitation  Amount: normal  Leaking occurs:occasionally  Nocturia: 2  Fluid Intake: water  Bladder irritants: 1 caff/day  Urine Stop test: not asked  Post void dribble: yes  Hovering: no  Empty bladder just in case: no  Do you  ever leak urine without knowing it? no  Difficulty emptying bladder    BOWEL HABITS  Types of symptoms: none   Frequency of bowel movements: 1x/day  Do you strain with defecation: No     SEXUAL HEALTH STATUS  No pain with intercourse    ASSESSMENT  Cleopatra presents to physical therapy evaluation with primary c/o pelvic pressure. The results of the objective tests and measures show  decreased pelvic floor muscle strength and coordination, fair bladder habits, decreased core strength. Functional deficits include but are not limited to difficulty emptying bladder.  Signs and symptoms are consistent with diagnosis of 28 weeks gestation of pregnancy (HCC) (Z3A.28)Pelvic pressure in pregnancy (HCC) (O26.899,R10.2)Stress incontinence (N39.3). Pt and PT discussed evaluation findings, pathology, POC and HEP.  Pt voiced understanding and performs HEP correctly without reported pain. Skilled Pelvic Physical Therapy is medically necessary to address the above impairments and reach functional goals.    OBJECTIVE:   Posture: rounded shoulders, increased lumbar lordosis  Pelvic Alignment: symmetrical   Gait: pt ambulates on level ground with normal mechanics.     External Palpation:increased B lumbar paraspinal tension  Scars (location/surgery): none  Abdominal Wall Integrity: pregnant    Range Of Motion  Lumbar AROM screen: wfl-back tenderness with flexion and extension  LE AROM screen: not assessed secondary to time constraints     Strength (MMT) 5/5 DAISHA LE   Transverse Abdominis: 1/5    Flexibility Summary: WNL DAISHA LE     Informed consent for internal pelvic evaluation given: Yes    External Observation:   Voluntary contraction: present   Voluntary relaxation: present  Involuntary contraction: present  Involuntary relaxation: present    Mons pubis: WNL  Labia majora: WNL  Labia minora: WNL  Urethral meatus: WNL  Introitus: WNL  Perineal body: WNL    Sensory/Reflex:  Vestibule: normal bilaterally  Anal [place]: Not Tested    Internal  Examination   Pelvic Floor Muscle strength: (PERF= Power/Endurance/Reps/Fast) MMT: 2/2/5/2  External Anal Sphincter: nt  Accessory Muscle Use: abdominals    Tissue Laxity Test:  Anterior Wall: WNL  Posterior Wall: WNL  Apical: WNL    Eccentric lengthening contraction: wnl  Bearing down Valsalva maneuver (2-3x): nwl    Internal Palpation: WNL   Today's Treatment and Response:   Patient education was provided on objective findings of external and internal evaluation and expectations with treatment outcomes. Educated on pelvic anatomy and function with diagrams and pelvic model, bladder normatives, adequate hydration levels, proper toileting posture, stress/urge urinary incontinence strategies, coordination of diaphragmatic breathing and pelvic floor contraction , and knack/pelvic muscle brace, perineal massage -35 weeks PG handout issues, bladder emptying, pregnancy belt-V2    Manual therapy/NM Re-ed- internal retraining pelvic floor muscles to learn to perform diaphragmatic breathing , abdominal bracing , kegel     Patient was instructed in and issued a HEP for: diet/bladder/bowel diary, diaphragmatic breathing/kegel 1 min TID    Charges: PT Eval Low Complexity, TE 10 NM 15      Total Timed Treatment: 25 min     Total Treatment Time: 45 min     PLAN OF CARE:    Goals: (to be met in 10 visits)  1. Independent in HEP and progression with improved understanding of bladder health, bladder retraining and long-term management.    2. Patient will demonstrate improved bladder voiding habits to voiding every 2 hours with less hesitancy and more complete emptying   3. Patient will demonstrate improve PFM isolation, coordination and strength to so she is able to reduce urinary urge and prevent leakage during ADL's.  4. Pt will have increased transverse abdominis muscle strength to 2/5 to assist with supporting pelvic floor muscles.   5. PFM contraction before increase intra-abdominal pressure.  6. Pt will improve transversus  abdominis recruitment to perform proper isometric contraction without requiring verbal or tactile cuing to promote advancement of therex  7. Pt will demonstrate good understanding of proper posture and body mechanics to decrease pain and improve spinal safety       Frequency / Duration: Patient will be seen for 1x/week or a total of 10 visits over a 90 day period.  Treatment will include: Manual Therapy, Neuromuscular Re-education, Self-Care Home Management, Therapeutic Activities, Therapeutic Exercise, and Home Exercise Program instruction     Education or treatment limitation: None  Rehab Potential:good      Patient/Family/Caregiver was advised of these findings, precautions, and treatment options and has agreed to actively participate in planning and for this course of care.    Thank you for your referral. Please co-sign or sign and return this letter via fax as soon as possible to [phone]. If you have any questions, please contact me at Dept: [phone]    Sincerely,  Electronically signed by therapist: Abimbola Perera, PT  Physician's certification required: Yes  I certify the need for these services furnished under this plan of treatment and while under my care.    X___________________________________________________ Date____________________    Certification From: 10/28/2024  To:1/26/2025             21st Century Cures Act Notice to Patient: Medical documents like this are made available to patients in the interest of transparency. However, be advised this is a medical document and it is intended as [id number] communication between your medical providers. This medical document may contain abbreviations, assessments, medical data, and results or other terms that are unfamiliar. Medical documents are intended to carry relevant information, facts as evident, and the clinical opinion of the practitioner. As such, this medical document may be written in language that appears blunt or direct. You are  encouraged to contact your medical provider and/or [facility] Patient Experience if you have any questions about this medical document.

## (undated) NOTE — LETTER
VACCINE ADMINISTRATION RECORD  PARENT / GUARDIAN APPROVAL  Date: 10/3/2024  Vaccine administered to: Cleopatra Chavez     : 1995    MRN: [id number]    A copy of the appropriate Centers for Disease Control and Prevention Vaccine Information statement has been provided. I have read or have had explained the information about the diseases and the vaccines listed below. There was an opportunity to ask questions and any questions were answered satisfactorily. I believe that I understand the benefits and risks of the vaccine cited and ask that the vaccine(s) listed below be given to me or to the person named above (for whom I am authorized to make this request).    VACCINES ADMINISTERED:  Influenza    I have read and hereby agree to be bound by the terms of this agreement as stated above. My signature is valid until revoked by me in writing.  This document is signed by self, relationship: Self on 10/3/2024.:                                                                                                                                         Parent / Guardian Signature                                                Date    Lilly OSMAN CMA served as a witness to authentication that the identity of the person signing electronically is in fact the person represented as signing.

## (undated) NOTE — LETTER
VACCINE ADMINISTRATION RECORD  PARENT / GUARDIAN APPROVAL  Date: 10/18/2024  Vaccine administered to: Cleopatra Chavez     : 1995    MRN: [id number]    A copy of the appropriate Centers for Disease Control and Prevention Vaccine Information statement has been provided. I have read or have had explained the information about the diseases and the vaccines listed below. There was an opportunity to ask questions and any questions were answered satisfactorily. I believe that I understand the benefits and risks of the vaccine cited and ask that the vaccine(s) listed below be given to me or to the person named above (for whom I am authorized to make this request).    VACCINES ADMINISTERED:  Tdap    I have read and hereby agree to be bound by the terms of this agreement as stated above. My signature is valid until revoked by me in writing.  This document is signed by  Cleopatra Chavez, relationship: Self on 10/18/2024.:                                                                                                                                         Parent / Guardian Signature                                                Date